# Patient Record
Sex: FEMALE | Race: BLACK OR AFRICAN AMERICAN | Employment: UNEMPLOYED | URBAN - METROPOLITAN AREA
[De-identification: names, ages, dates, MRNs, and addresses within clinical notes are randomized per-mention and may not be internally consistent; named-entity substitution may affect disease eponyms.]

---

## 2019-01-09 ENCOUNTER — HISTORICAL (OUTPATIENT)
Dept: ADMINISTRATIVE | Facility: HOSPITAL | Age: 59
End: 2019-01-09

## 2019-01-09 LAB
ERYTHROCYTE [DISTWIDTH] IN BLOOD BY AUTOMATED COUNT: 12.7 % (ref 11.5–17)
HCT VFR BLD AUTO: 42.7 % (ref 37–47)
HGB BLD-MCNC: 13.7 GM/DL (ref 12–16)
MCH RBC QN AUTO: 27.6 PG (ref 27–31)
MCHC RBC AUTO-ENTMCNC: 32.1 GM/DL (ref 33–36)
MCV RBC AUTO: 86.1 FL (ref 80–94)
PLATELET # BLD AUTO: 257 X10(3)/MCL (ref 130–400)
PMV BLD AUTO: 10.8 FL (ref 9.4–12.4)
RBC # BLD AUTO: 4.96 X10(6)/MCL (ref 4.2–5.4)
WBC # SPEC AUTO: 6.7 X10(3)/MCL (ref 4.5–11.5)

## 2019-01-10 ENCOUNTER — HISTORICAL (OUTPATIENT)
Dept: ADMINISTRATIVE | Facility: HOSPITAL | Age: 59
End: 2019-01-10

## 2019-09-09 ENCOUNTER — HOSPITAL ENCOUNTER (OUTPATIENT)
Dept: MEDSURG UNIT | Facility: HOSPITAL | Age: 59
End: 2019-09-11
Attending: INTERNAL MEDICINE | Admitting: INTERNAL MEDICINE

## 2019-09-09 LAB
ABS NEUT (OLG): 3.2 X10(3)/MCL (ref 2.1–9.2)
ALBUMIN SERPL-MCNC: 3.4 GM/DL (ref 3.4–5)
ALBUMIN/GLOB SERPL: 0.9 RATIO (ref 1.1–2)
ALP SERPL-CCNC: 96 UNIT/L (ref 45–117)
ALT SERPL-CCNC: 39 UNIT/L (ref 12–78)
AST SERPL-CCNC: 26 UNIT/L (ref 15–37)
BASOPHILS NFR BLD MANUAL: 0 %
BILIRUB SERPL-MCNC: 0.2 MG/DL (ref 0.2–1)
BILIRUBIN DIRECT+TOT PNL SERPL-MCNC: <0.1 MG/DL
BILIRUBIN DIRECT+TOT PNL SERPL-MCNC: ABNORMAL MG/DL
BUN SERPL-MCNC: 15 MG/DL (ref 7–18)
CALCIUM SERPL-MCNC: 8.6 MG/DL (ref 8.5–10.1)
CHLORIDE SERPL-SCNC: 107 MMOL/L (ref 98–107)
CK MB SERPL-MCNC: 1.2 NG/ML (ref 1–3.6)
CK SERPL-CCNC: 118 UNIT/L (ref 26–192)
CO2 SERPL-SCNC: 28 MMOL/L (ref 21–32)
CREAT SERPL-MCNC: 0.7 MG/DL (ref 0.6–1.3)
EOSINOPHIL NFR BLD MANUAL: 0 %
ERYTHROCYTE [DISTWIDTH] IN BLOOD BY AUTOMATED COUNT: 12.9 % (ref 11.5–14.5)
GLOBULIN SER-MCNC: 3.8 GM/ML (ref 2.3–3.5)
GLUCOSE SERPL-MCNC: 172 MG/DL (ref 74–106)
GRANULOCYTES NFR BLD MANUAL: 38 % (ref 43–75)
HCT VFR BLD AUTO: 38.5 % (ref 35–46)
HGB BLD-MCNC: 12.7 GM/DL (ref 12–16)
LIPASE SERPL-CCNC: 764 UNIT/L (ref 73–393)
LYMPHOCYTES NFR BLD MANUAL: 57 % (ref 20.5–51.1)
MAGNESIUM SERPL-MCNC: 1.8 MG/DL (ref 1.6–2.6)
MCH RBC QN AUTO: 28.5 PG (ref 26–34)
MCHC RBC AUTO-ENTMCNC: 33 GM/DL (ref 31–37)
MCV RBC AUTO: 86.3 FL (ref 80–100)
MONOCYTES NFR BLD MANUAL: 5 % (ref 2–9)
PLATELET # BLD AUTO: 253 X10(3)/MCL (ref 130–400)
PLATELET # BLD EST: NORMAL 10*3/UL
PMV BLD AUTO: 10.6 FL (ref 7.4–10.4)
POTASSIUM SERPL-SCNC: 3.2 MMOL/L (ref 3.5–5.1)
PROT SERPL-MCNC: 7.2 GM/DL (ref 6.4–8.2)
RBC # BLD AUTO: 4.46 X10(6)/MCL (ref 4–5.2)
RBC MORPH BLD: NORMAL
SODIUM SERPL-SCNC: 141 MMOL/L (ref 136–145)
TROPONIN I SERPL-MCNC: <0.015 NG/ML (ref 0–0.05)
WBC # SPEC AUTO: 8.1 X10(3)/MCL (ref 4.5–11)

## 2019-09-10 LAB
ABS NEUT (OLG): 3.67 X10(3)/MCL (ref 2.1–9.2)
ALBUMIN SERPL-MCNC: 3.4 GM/DL (ref 3.4–5)
ALBUMIN/GLOB SERPL: 0.9 RATIO (ref 1.1–2)
ALP SERPL-CCNC: 79 UNIT/L (ref 45–117)
ALT SERPL-CCNC: 35 UNIT/L (ref 12–78)
AST SERPL-CCNC: 18 UNIT/L (ref 15–37)
BASOPHILS # BLD AUTO: 0 X10(3)/MCL (ref 0–0.2)
BASOPHILS NFR BLD AUTO: 0 %
BILIRUB SERPL-MCNC: 0.3 MG/DL (ref 0.2–1)
BILIRUBIN DIRECT+TOT PNL SERPL-MCNC: <0.1 MG/DL
BILIRUBIN DIRECT+TOT PNL SERPL-MCNC: ABNORMAL MG/DL
BUN SERPL-MCNC: 10 MG/DL (ref 7–18)
CALCIUM SERPL-MCNC: 8.8 MG/DL (ref 8.5–10.1)
CHLORIDE SERPL-SCNC: 107 MMOL/L (ref 98–107)
CO2 SERPL-SCNC: 31 MMOL/L (ref 21–32)
CREAT SERPL-MCNC: 0.6 MG/DL (ref 0.6–1.3)
EOSINOPHIL # BLD AUTO: 0.1 X10(3)/MCL (ref 0–0.9)
EOSINOPHIL NFR BLD AUTO: 2 %
ERYTHROCYTE [DISTWIDTH] IN BLOOD BY AUTOMATED COUNT: 12.8 % (ref 11.5–14.5)
EST. AVERAGE GLUCOSE BLD GHB EST-MCNC: 137 MG/DL
GLOBULIN SER-MCNC: 3.8 GM/ML (ref 2.3–3.5)
GLUCOSE SERPL-MCNC: 121 MG/DL (ref 74–106)
HBA1C MFR BLD: 6.4 % (ref 4.2–6.3)
HCT VFR BLD AUTO: 38 % (ref 35–46)
HGB BLD-MCNC: 12.4 GM/DL (ref 12–16)
IMM GRANULOCYTES # BLD AUTO: 0.01 10*3/UL
IMM GRANULOCYTES NFR BLD AUTO: 0 %
LYMPHOCYTES # BLD AUTO: 3.6 X10(3)/MCL (ref 0.6–4.6)
LYMPHOCYTES NFR BLD AUTO: 45 %
MCH RBC QN AUTO: 27.7 PG (ref 26–34)
MCHC RBC AUTO-ENTMCNC: 32.6 GM/DL (ref 31–37)
MCV RBC AUTO: 85 FL (ref 80–100)
MONOCYTES # BLD AUTO: 0.5 X10(3)/MCL (ref 0.1–1.3)
MONOCYTES NFR BLD AUTO: 6 %
NEUTROPHILS # BLD AUTO: 3.67 X10(3)/MCL (ref 2.1–9.2)
NEUTROPHILS NFR BLD AUTO: 46 %
PLATELET # BLD AUTO: 240 X10(3)/MCL (ref 130–400)
PMV BLD AUTO: 10.8 FL (ref 7.4–10.4)
POTASSIUM SERPL-SCNC: 4 MMOL/L (ref 3.5–5.1)
PROT SERPL-MCNC: 7.2 GM/DL (ref 6.4–8.2)
RBC # BLD AUTO: 4.47 X10(6)/MCL (ref 4–5.2)
SODIUM SERPL-SCNC: 141 MMOL/L (ref 136–145)
WBC # SPEC AUTO: 7.9 X10(3)/MCL (ref 4.5–11)

## 2019-09-11 LAB
ABS NEUT (OLG): 1.72 X10(3)/MCL (ref 2.1–9.2)
ALBUMIN SERPL-MCNC: 2.8 GM/DL (ref 3.4–5)
ALBUMIN/GLOB SERPL: 0.8 RATIO (ref 1.1–2)
ALP SERPL-CCNC: 69 UNIT/L (ref 45–117)
ALT SERPL-CCNC: 27 UNIT/L (ref 12–78)
ANISOCYTOSIS BLD QL SMEAR: NORMAL
APPEARANCE, UA: CLEAR
AST SERPL-CCNC: 15 UNIT/L (ref 15–37)
BACTERIA #/AREA URNS AUTO: ABNORMAL /[HPF]
BASOPHILS NFR BLD MANUAL: 1 %
BILIRUB SERPL-MCNC: 0.3 MG/DL (ref 0.2–1)
BILIRUB UR QL STRIP: NEGATIVE
BILIRUBIN DIRECT+TOT PNL SERPL-MCNC: <0.1 MG/DL (ref 0–0.2)
BILIRUBIN DIRECT+TOT PNL SERPL-MCNC: >0.2 MG/DL
BUN SERPL-MCNC: 7 MG/DL (ref 7–18)
CALCIUM SERPL-MCNC: 7.9 MG/DL (ref 8.5–10.1)
CHLORIDE SERPL-SCNC: 108 MMOL/L (ref 98–107)
CHOLEST SERPL-MCNC: 199 MG/DL
CHOLEST/HDLC SERPL: 4.6 {RATIO} (ref 0–4.4)
CO2 SERPL-SCNC: 29 MMOL/L (ref 21–32)
COLOR UR: NORMAL
CREAT SERPL-MCNC: 0.6 MG/DL (ref 0.6–1.3)
CREAT UR-MCNC: 34 MG/DL
EOSINOPHIL NFR BLD MANUAL: 7 %
ERYTHROCYTE [DISTWIDTH] IN BLOOD BY AUTOMATED COUNT: 12.8 % (ref 11.5–14.5)
GLOBULIN SER-MCNC: 3.6 GM/ML (ref 2.3–3.5)
GLUCOSE (UA): NORMAL
GLUCOSE SERPL-MCNC: 123 MG/DL (ref 74–106)
GRANULOCYTES NFR BLD MANUAL: 47 % (ref 43–75)
HCT VFR BLD AUTO: 35.6 % (ref 35–46)
HDLC SERPL-MCNC: 43 MG/DL (ref 40–59)
HGB BLD-MCNC: 11.4 GM/DL (ref 12–16)
HGB UR QL STRIP: NEGATIVE
HYALINE CASTS #/AREA URNS LPF: ABNORMAL /[LPF]
HYPOCHROMIA BLD QL SMEAR: NORMAL
KETONES UR QL STRIP: NEGATIVE
LDLC SERPL CALC-MCNC: 115 MG/DL
LEUKOCYTE ESTERASE UR QL STRIP: NEGATIVE
LIPASE SERPL-CCNC: 189 UNIT/L (ref 73–393)
LYMPHOCYTES NFR BLD MANUAL: 42 % (ref 20.5–51.1)
MCH RBC QN AUTO: 27.4 PG (ref 26–34)
MCHC RBC AUTO-ENTMCNC: 32 GM/DL (ref 31–37)
MCV RBC AUTO: 85.6 FL (ref 80–100)
MICROALBUMIN UR-MCNC: 5.8 MG/L (ref 0–19)
MICROALBUMIN/CREAT RATIO PNL UR: 17.1 MCG/MG CR (ref 0–29)
MICROCYTES BLD QL SMEAR: NORMAL
MONOCYTES NFR BLD MANUAL: 3 % (ref 2–9)
NITRITE UR QL STRIP: NEGATIVE
PH UR STRIP: 7.5 [PH] (ref 4.5–8)
PLATELET # BLD AUTO: 208 X10(3)/MCL (ref 130–400)
PLATELET # BLD EST: ADEQUATE 10*3/UL
PMV BLD AUTO: 10.5 FL (ref 7.4–10.4)
POLYCHROMASIA BLD QL SMEAR: NORMAL
POTASSIUM SERPL-SCNC: 3.9 MMOL/L (ref 3.5–5.1)
PROT SERPL-MCNC: 6.4 GM/DL (ref 6.4–8.2)
PROT UR QL STRIP: NEGATIVE
RBC # BLD AUTO: 4.16 X10(6)/MCL (ref 4–5.2)
RBC #/AREA URNS AUTO: ABNORMAL /[HPF]
RBC MORPH BLD: NORMAL
SODIUM SERPL-SCNC: 143 MMOL/L (ref 136–145)
SP GR UR STRIP: 1.01 (ref 1–1.03)
SQUAMOUS #/AREA URNS LPF: ABNORMAL /[LPF]
TRIGL SERPL-MCNC: 205 MG/DL
UROBILINOGEN UR STRIP-ACNC: NORMAL
VLDLC SERPL CALC-MCNC: 41 MG/DL
WBC # SPEC AUTO: 5.8 X10(3)/MCL (ref 4.5–11)
WBC #/AREA URNS AUTO: ABNORMAL /HPF

## 2019-11-26 ENCOUNTER — HISTORICAL (OUTPATIENT)
Dept: ADMINISTRATIVE | Facility: HOSPITAL | Age: 59
End: 2019-11-26

## 2019-12-18 ENCOUNTER — HOSPITAL ENCOUNTER (OUTPATIENT)
Dept: MEDSURG UNIT | Facility: HOSPITAL | Age: 59
End: 2019-12-20
Attending: INTERNAL MEDICINE | Admitting: INTERNAL MEDICINE

## 2019-12-18 LAB
ALBUMIN SERPL-MCNC: 3.6 GM/DL (ref 3.4–5)
ALBUMIN/GLOB SERPL: 0.8 RATIO (ref 1.1–2)
ALP SERPL-CCNC: 91 UNIT/L (ref 45–117)
ALT SERPL-CCNC: 30 UNIT/L (ref 12–78)
ANISOCYTOSIS BLD QL SMEAR: ABNORMAL
AST SERPL-CCNC: 13 UNIT/L (ref 15–37)
BASOPHILS NFR BLD MANUAL: 1 %
BILIRUB SERPL-MCNC: 0.3 MG/DL (ref 0.2–1)
BILIRUBIN DIRECT+TOT PNL SERPL-MCNC: 0.1 MG/DL (ref 0–0.2)
BILIRUBIN DIRECT+TOT PNL SERPL-MCNC: 0.2 MG/DL
BUN SERPL-MCNC: 15 MG/DL (ref 7–18)
CALCIUM SERPL-MCNC: 9.2 MG/DL (ref 8.5–10.1)
CHLORIDE SERPL-SCNC: 102 MMOL/L (ref 98–107)
CK MB SERPL-MCNC: <1 NG/ML (ref 1–3.6)
CK SERPL-CCNC: 70 UNIT/L (ref 26–192)
CO2 SERPL-SCNC: 30 MMOL/L (ref 21–32)
CREAT SERPL-MCNC: 0.7 MG/DL (ref 0.6–1.3)
EOSINOPHIL NFR BLD MANUAL: 1 %
ERYTHROCYTE [DISTWIDTH] IN BLOOD BY AUTOMATED COUNT: 12.9 % (ref 11.5–14.5)
GLOBULIN SER-MCNC: 4.4 GM/ML (ref 2.3–3.5)
GLUCOSE SERPL-MCNC: 150 MG/DL (ref 74–106)
GRANULOCYTES NFR BLD MANUAL: 40 % (ref 43–75)
HCT VFR BLD AUTO: 42.3 % (ref 35–46)
HGB BLD-MCNC: 13.6 GM/DL (ref 12–16)
LDH SERPL-CCNC: 159 UNIT/L (ref 84–246)
LIPASE SERPL-CCNC: 534 UNIT/L (ref 73–393)
LYMPHOCYTES NFR BLD MANUAL: 57 % (ref 20.5–51.1)
MCH RBC QN AUTO: 27.8 PG (ref 26–34)
MCHC RBC AUTO-ENTMCNC: 32.2 GM/DL (ref 31–37)
MCV RBC AUTO: 86.3 FL (ref 80–100)
MONOCYTES NFR BLD MANUAL: 1 % (ref 2–9)
PLATELET # BLD AUTO: 274 X10(3)/MCL (ref 130–400)
PLATELET # BLD EST: NORMAL 10*3/UL
PMV BLD AUTO: 10.7 FL (ref 7.4–10.4)
POC TROPONIN: 0 NG/ML (ref 0–0.08)
POLYCHROMASIA BLD QL SMEAR: ABNORMAL
POTASSIUM SERPL-SCNC: 3.8 MMOL/L (ref 3.5–5.1)
PROT SERPL-MCNC: 8 GM/DL (ref 6.4–8.2)
RBC # BLD AUTO: 4.9 X10(6)/MCL (ref 4–5.2)
RBC MORPH BLD: ABNORMAL
SODIUM SERPL-SCNC: 138 MMOL/L (ref 136–145)
WBC # SPEC AUTO: 8.3 X10(3)/MCL (ref 4.5–11)

## 2019-12-19 LAB
ABS NEUT (OLG): 2.55 X10(3)/MCL (ref 2.1–9.2)
ALBUMIN SERPL-MCNC: 3.1 GM/DL (ref 3.4–5)
ALBUMIN/GLOB SERPL: 0.8 RATIO (ref 1.1–2)
ALP SERPL-CCNC: 76 UNIT/L (ref 45–117)
ALT SERPL-CCNC: 25 UNIT/L (ref 12–78)
AST SERPL-CCNC: 11 UNIT/L (ref 15–37)
BASOPHILS # BLD AUTO: 0 X10(3)/MCL (ref 0–0.2)
BASOPHILS NFR BLD AUTO: 0 %
BILIRUB SERPL-MCNC: 0.3 MG/DL (ref 0.2–1)
BILIRUBIN DIRECT+TOT PNL SERPL-MCNC: 0.1 MG/DL (ref 0–0.2)
BILIRUBIN DIRECT+TOT PNL SERPL-MCNC: 0.2 MG/DL
BUN SERPL-MCNC: 13 MG/DL (ref 7–18)
CALCIUM SERPL-MCNC: 9 MG/DL (ref 8.5–10.1)
CHLORIDE SERPL-SCNC: 105 MMOL/L (ref 98–107)
CHOLEST SERPL-MCNC: 209 MG/DL
CHOLEST/HDLC SERPL: 4.3 {RATIO} (ref 0–4.4)
CO2 SERPL-SCNC: 29 MMOL/L (ref 21–32)
CREAT SERPL-MCNC: 0.6 MG/DL (ref 0.6–1.3)
EOSINOPHIL # BLD AUTO: 0.1 X10(3)/MCL (ref 0–0.9)
EOSINOPHIL NFR BLD AUTO: 2 %
ERYTHROCYTE [DISTWIDTH] IN BLOOD BY AUTOMATED COUNT: 12.8 % (ref 11.5–14.5)
GLOBULIN SER-MCNC: 3.8 GM/ML (ref 2.3–3.5)
GLUCOSE SERPL-MCNC: 134 MG/DL (ref 74–106)
HCT VFR BLD AUTO: 38.7 % (ref 35–46)
HDLC SERPL-MCNC: 49 MG/DL (ref 40–59)
HGB BLD-MCNC: 12.6 GM/DL (ref 12–16)
IMM GRANULOCYTES # BLD AUTO: 0.02 10*3/UL
IMM GRANULOCYTES NFR BLD AUTO: 0 %
LDLC SERPL CALC-MCNC: 132 MG/DL
LYMPHOCYTES # BLD AUTO: 3.5 X10(3)/MCL (ref 0.6–4.6)
LYMPHOCYTES NFR BLD AUTO: 52 %
MCH RBC QN AUTO: 28.1 PG (ref 26–34)
MCHC RBC AUTO-ENTMCNC: 32.6 GM/DL (ref 31–37)
MCV RBC AUTO: 86.4 FL (ref 80–100)
MONOCYTES # BLD AUTO: 0.5 X10(3)/MCL (ref 0.1–1.3)
MONOCYTES NFR BLD AUTO: 7 %
NEUTROPHILS # BLD AUTO: 2.55 X10(3)/MCL (ref 2.1–9.2)
NEUTROPHILS NFR BLD AUTO: 38 %
PLATELET # BLD AUTO: 258 X10(3)/MCL (ref 130–400)
PMV BLD AUTO: 10.5 FL (ref 7.4–10.4)
POTASSIUM SERPL-SCNC: 4 MMOL/L (ref 3.5–5.1)
PROT SERPL-MCNC: 6.9 GM/DL (ref 6.4–8.2)
RBC # BLD AUTO: 4.48 X10(6)/MCL (ref 4–5.2)
SODIUM SERPL-SCNC: 140 MMOL/L (ref 136–145)
TRIGL SERPL-MCNC: 138 MG/DL
TROPONIN I SERPL-MCNC: <0.015 NG/ML (ref 0–0.05)
VLDLC SERPL CALC-MCNC: 28 MG/DL
WBC # SPEC AUTO: 6.7 X10(3)/MCL (ref 4.5–11)

## 2019-12-20 LAB
ABS NEUT (OLG): 1.88 X10(3)/MCL (ref 2.1–9.2)
ALBUMIN SERPL-MCNC: 3.1 GM/DL (ref 3.4–5)
ALBUMIN/GLOB SERPL: 0.9 RATIO (ref 1.1–2)
ALP SERPL-CCNC: 64 UNIT/L (ref 45–117)
ALT SERPL-CCNC: 21 UNIT/L (ref 12–78)
AST SERPL-CCNC: 11 UNIT/L (ref 15–37)
BASOPHILS # BLD AUTO: 0 X10(3)/MCL (ref 0–0.2)
BASOPHILS NFR BLD AUTO: 0 %
BILIRUB SERPL-MCNC: 0.4 MG/DL (ref 0.2–1)
BILIRUBIN DIRECT+TOT PNL SERPL-MCNC: <0.1 MG/DL (ref 0–0.2)
BILIRUBIN DIRECT+TOT PNL SERPL-MCNC: ABNORMAL MG/DL
BUN SERPL-MCNC: 7 MG/DL (ref 7–18)
CALCIUM SERPL-MCNC: 9 MG/DL (ref 8.5–10.1)
CHLORIDE SERPL-SCNC: 106 MMOL/L (ref 98–107)
CO2 SERPL-SCNC: 30 MMOL/L (ref 21–32)
CREAT SERPL-MCNC: 0.6 MG/DL (ref 0.6–1.3)
EOSINOPHIL # BLD AUTO: 0.1 X10(3)/MCL (ref 0–0.9)
EOSINOPHIL NFR BLD AUTO: 2 %
ERYTHROCYTE [DISTWIDTH] IN BLOOD BY AUTOMATED COUNT: 12.9 % (ref 11.5–14.5)
GLOBULIN SER-MCNC: 3.6 GM/ML (ref 2.3–3.5)
GLUCOSE SERPL-MCNC: 139 MG/DL (ref 74–106)
HCT VFR BLD AUTO: 37.2 % (ref 35–46)
HGB BLD-MCNC: 11.9 GM/DL (ref 12–16)
LYMPHOCYTES # BLD AUTO: 3.2 X10(3)/MCL (ref 0.6–4.6)
LYMPHOCYTES NFR BLD AUTO: 56 %
MCH RBC QN AUTO: 27.8 PG (ref 26–34)
MCHC RBC AUTO-ENTMCNC: 32 GM/DL (ref 31–37)
MCV RBC AUTO: 86.9 FL (ref 80–100)
MONOCYTES # BLD AUTO: 0.4 X10(3)/MCL (ref 0.1–1.3)
MONOCYTES NFR BLD AUTO: 8 %
NEUTROPHILS # BLD AUTO: 1.88 X10(3)/MCL (ref 2.1–9.2)
NEUTROPHILS NFR BLD AUTO: 33 %
PLATELET # BLD AUTO: 234 X10(3)/MCL (ref 130–400)
PMV BLD AUTO: 10.5 FL (ref 7.4–10.4)
POTASSIUM SERPL-SCNC: 4 MMOL/L (ref 3.5–5.1)
PROT SERPL-MCNC: 6.7 GM/DL (ref 6.4–8.2)
RBC # BLD AUTO: 4.28 X10(6)/MCL (ref 4–5.2)
SODIUM SERPL-SCNC: 141 MMOL/L (ref 136–145)
WBC # SPEC AUTO: 5.6 X10(3)/MCL (ref 4.5–11)

## 2020-01-06 ENCOUNTER — HISTORICAL (OUTPATIENT)
Dept: CARDIOLOGY | Facility: HOSPITAL | Age: 60
End: 2020-01-06

## 2021-12-28 ENCOUNTER — HISTORICAL (OUTPATIENT)
Dept: ADMINISTRATIVE | Facility: HOSPITAL | Age: 61
End: 2021-12-28

## 2021-12-28 LAB — SARS-COV-2 RNA RESP QL NAA+PROBE: NOT DETECTED

## 2022-04-11 ENCOUNTER — HISTORICAL (OUTPATIENT)
Dept: ADMINISTRATIVE | Facility: HOSPITAL | Age: 62
End: 2022-04-11
Payer: MEDICAID

## 2022-04-27 VITALS
SYSTOLIC BLOOD PRESSURE: 126 MMHG | WEIGHT: 194.44 LBS | HEIGHT: 63 IN | DIASTOLIC BLOOD PRESSURE: 82 MMHG | OXYGEN SATURATION: 99 % | BODY MASS INDEX: 34.45 KG/M2

## 2022-04-30 NOTE — H&P
Patient:   Abbi Amador            MRN: [id number]            FIN: [id number]               Age:   58 years     Sex:  Female     :  1960   Associated Diagnoses:   None   Author:   Gini Thurston MD      Chief Complaint   2019 22:19 CST     ABDOMINAL PAIN WITH  NAUSEA        History of Present Illness   58 year old female with PMHx of HTN, DM presented to the ED on 19 for 4 day history of worsened abdominal pain. She originally presented to the ED on 12/15/19 for this same pain and treated for GERD with Protonix and Zantac at that time. The pain never resolved and in fact increased which prompted her to seek evaluation. She located the pain in her left upper quadrant and stated that it radiated to midline and straight to her back.  She describes it as an intermittent gnawing pain associated with nausea and decreased appetite.  Anything by mouth increases her nausea and pain.  She denied vomiting, diarrhea, fever, chills.  The pain has caused her to miss the past 3 days of work.  Of note she stated that this pain originally began about one year ago and she believes it is associated with taking metformin.    ED Course: On presentation she was normotensive at 134/75, HR 95, afebrile, breathing at a rate of 20 breaths per minute, and satting 98 % on RA.  She was given a GI cocktail which reduced her pain some.  Labs were significant for WBC that was within normal limits.  And a lipase of 534 which was increased from 166 from her 12/15/1980 visit.  Given the description of pain typical of pancreatitis and elevated lipase level, medicine was consulted for admission for acute pancreatitis.    Meds: Amlodipine/benazepril 5/10mg, rosuvastatin 40 mg, prescribed metformin but is not taking due to abdominal pain  FamHx: Mother breast cancer,   PSHx: Hysterectomy, right carpal tunnel surgery  Social Hx: 5 cigarettes per day ×25 years, No drinking, No illicit drug use  Allergies:  NSAIDs, sulfa drugs      Review of Systems   Constitutional:  No fever, No chills.    Ear/Nose/Mouth/Throat:  No nasal congestion, No sore throat.    Respiratory:  No shortness of breath.    Cardiovascular:  No chest pain, No peripheral edema.    Gastrointestinal:  Nausea, No vomiting, No diarrhea, No constipation.         Abdominal pain: Left, Upper quadrant, Epigastric area.    Genitourinary:  No dysuria.    Musculoskeletal:  No back pain, No joint pain, No muscle pain.    Integumentary:  No other significant skin complaints, No rash.    Neurologic:  Alert and oriented X4, No numbness, No tingling, No headache.       Physical Examination      Vital Signs (last 24 hrs)_____  Last Charted___________  Temp Oral     37.0 DegC  (DEC 19 00:55)  Heart Rate Peripheral   L 55bpm  (DEC 19 00:55)  Resp Rate         18 br/min  (DEC 19 00:55)  SBP      H 145mmHg  (DEC 19 00:55)  DBP      83 mmHg  (DEC 19 00:55)  SpO2      100 %  (DEC 19 00:55)  Weight      84 kg  (DEC 19 00:58)  Height      159 cm  (DEC 19 00:58)  BMI      33.23  (DEC 19 00:58)     General:  No acute distress, Lying in bed, appears mildly uncomfortable.    Eye:  Extraocular movements are intact, Normal conjunctiva.    HENT:  Normocephalic.    Neck:  Supple, Non-tender, No lymphadenopathy, No thyromegaly.    Respiratory:  Lungs are clear to auscultation, Respirations are non-labored, Breath sounds are equal, Symmetrical chest wall expansion.    Cardiovascular:  Normal rate, Regular rhythm, No murmur, No gallop, No edema.    Gastrointestinal:  Soft, Non-distended, Normal bowel sounds, No organomegaly.         Abdomen: Tenderness, To deep palpation in left upper quadrant.    Musculoskeletal:  No swelling, No deformity.    Integumentary:  Warm, Dry, Intact, No pallor, No rash.    Neurologic:  Alert, Oriented, No focal deficits.    Cognition and Speech:  Speech clear and coherent.    Psychiatric:  Cooperative, Appropriate mood & affect.       Review / Management    Results review:  All Results   12/18/2019 22:49 CST     POC Troponin              0.00 ng/mL    12/18/2019 22:48 CST     WBC                       8.3 x10(3)/mcL                             RBC                       4.90 x10(6)/mcL                             Hgb                       13.6 gm/dL                             Hct                       42.3 %                             Platelet                  274 x10(3)/mcL                             MCV                       86.3 fL                             MCH                       27.8 pg                             MCHC                      32.2 gm/dL                             RDW                       12.9 %                             MPV                       10.7 fL  HI                             Segs Man                  40 %  LOW                             Lymph Man                 57.0 %  HI                             Monocyte Man              1 %  LOW                             Eos Man                   1 %                             Basophil Man              1 %                             Platelet Est              Normal                             Anisocyte                 1+                             Polychrom                 1+                             RBC Morph                 Abnormal                             Sodium Lvl                138 mmol/L                             Potassium Lvl             3.8 mmol/L                             Chloride                  102 mmol/L                             CO2                       30 mmol/L                             Calcium Lvl               9.2 mg/dL                             Glucose Lvl               150 mg/dL  HI                             BUN                       15 mg/dL                             Creatinine                0.70 mg/dL                             eGFR-AA                   >105 mL/min                             eGFR-ABHAY                  92 mL/min                              Bili Total                0.3 mg/dL                             Bili Direct               0.1 mg/dL                             Bili Indirect             0.2 mg/dL                             AST                       13 unit/L  LOW                             ALT                       30 unit/L                             Alk Phos                  91 unit/L                             Total Protein             8.0 gm/dL                             Albumin Lvl               3.6 gm/dL                             Globulin                  4.40 gm/mL  HI                             A/G Ratio                 0.8 ratio  LOW                             LDH                       159 unit/L                             Lipase Lvl                534 unit/L  HI                             Total CK                  70 unit/L                             CK MB                     <1.0 ng/mL  .       Impression and Plan   58 year old female with PMHx of HTN, DM admitted for acute pancreatitis    1.  Acute pancreatitis  -NPO     -Consider advancing as tolerated in AM  -LR 1L bolus then 300mL/hr  -Morphine 1mg q3h PRN  -Zofran 4mg q4h PRN Nausea    2. DM  -Holding home metformin     -Due to possibility of drug induced pancreatitis  -Monitor CBG    3. HTN  -Holding home benazepril     -Due to possibility of drug induced pancreatitis  -Continue home amlodipine    Access: PIV  Antibiotics: None  Diet: NPO  DVT Prophylaxis: SCDs  GI Prophylaxis: None  Fluids: LR 300mL/hr     Disposition: Admit to medical unit. Continue IVF and pain control. Advanced diet as tolerated once pain and nausea controlled.

## 2022-04-30 NOTE — ED PROVIDER NOTES
"   Patient:   Abbi Amador            MRN: [id number]            FIN: [id number]               Age:   58 years     Sex:  Female     :  1960   Associated Diagnoses:   Gastritis; Pancreatitis   Author:   Sarthak Saini MD      Basic Information   Time seen: Date & time 2019 20:21:00.   History source: Patient.   Arrival mode: Private vehicle.   History limitation: None.   Additional information: Chief Complaint from Nursing Triage Note : Chief Complaint   2019 19:57 CDT       Chief Complaint           mid upper abd pain and nausea since this am; states "ate greasy chicken last night". took x3 antacid tums and Tylenol x2 500mg approx 1hr pta.  (Modified) .   Provider/Visit info:   Time Seen:  Sarthak Saini MD / 2019 20:07  .   History of Present Illness   The patient presents with epigastric pain that is crampy which started this AM.  nausea, no vomiting..  The onset was 12  hours ago.  The course/duration of symptoms is constant and worsening.  Location: epigastric. Radiating pain: none. The character of symptoms is crampy  .  The degree at onset was moderate.  The degree at maximum was 7 /10.  .  The degree at present is 7 /10.  The exacerbating factor is none.  The relieving factor is none.  Risk factors consist of hypertension, diabetes mellitus and obesity.  Prior episodes: none.  Therapy today None.  Associated symptoms: nausea, denies shortness of breath, denies vomiting, denies diaphoresis, denies anxiety and denies palpitations.        Review of Systems   Constitutional symptoms:  No fever, no chills, no sweats.    Skin symptoms:  No rash,    Eye symptoms:  No recent vision problems,    ENMT symptoms:  No sore throat,    Respiratory symptoms:  No shortness of breath, no cough.    Cardiovascular symptoms:  No chest pain, no tachycardia.    Gastrointestinal symptoms:  Negative except as documented in HPI.   Genitourinary symptoms:  No dysuria,    Musculoskeletal symptoms:  No " back pain, no Muscle pain.    Neurologic symptoms:  No headache, no dizziness.              Additional review of systems information: All other systems reviewed and otherwise negative.      Health Status   Allergies:    Allergic Reactions (Selected)  Severity Not Documented  NSAIDs- No reactions were documented.  Sulfamethoxazole- No reactions were documented.,    Allergies (2) Active Reaction  NSAIDs None Documented  sulfamethoxazole None Documented  .   Medications:  (Selected)   Inpatient Medications  Ordered  GI Cocktail - [facility]: 40 mL, form: Susp, Oral, Once, first dose 09/09/19 20:09:00 CDT, stop date 09/09/19 20:09:00 CDT, STAT  Prescriptions  Prescribed  Norvasc 5 mg oral tablet: 5 mg = 1 tab(s), Oral, Daily, Patient needs to make appointment, # 30 tab(s), 0 Refill(s), Pharmacy: [id number]  Documented Medications  Documented  ATORVASTATIN 40 MG TABLET:   JANUMET XR  MG TABLET: 1 tab(s), Oral, Daily.      Past Medical/ Family/ Social History   Medical history:    No active or resolved past medical history items have been selected or recorded..   Surgical history:    76437 - EXC GANGLION, WRIST (Right) on 1/10/2019 at 58 Years.  Comments:  1/10/2019 8:00 Jennifer Sanchez RN  auto-populated from documented surgical case  [id number] - NEUROPLASTY / TRANSPOSITION MED NRV CARPAL TUNNEL (Right) on 1/10/2019 at 58 Years.  Comments:  1/10/2019 8:00 Jennifer Sanchez RN  auto-populated from documented surgical case  Biopsy Gastrointestional on 1/16/2015 at 54 Years.  Comments:  1/16/2015 9:45 Waqas Stringer RN  auto-populated from documented surgical case  Colonoscopy on 1/16/2015 at 54 Years.  Comments:  1/16/2015 9:45 Waqas Stringer RN  auto-populated from documented surgical case  Abdominal hysterectomy (162260419) in 1997 at 36 Years.  Hysterectomy ([id number]).  Hysterectomy ([id number]).  breast cyst removed.  Comments:  1/10/2019 6:41 ANITA Brewer  Aries CUELLAR, Antoinette MORROW  2016.   Family history:    No family history items have been selected or recorded..   Social history:    Social & Psychosocial Habits    Alcohol  01/16/2015 Risk Assessment: Denies Alcohol Use      Comment: denies - 11/26/2018 00:28 Shamar Rubi RN.    Substance Use  01/16/2015 Risk Assessment: Denies Substance Abuse      Comment: denies - 11/26/2018 00:28 - Shamar Ryan RN.    Tobacco  09/09/2019  Use: 4 or less cigarettes(less    Type: Cigarettes    Patient Wants Consult For Cessation Counseling No    Abuse/Neglect  09/09/2019  SHX Any signs of abuse or neglect No  .   Problem list:    Active Problems (5)  Chronic back pain   Diabetes mellitus   Diverticulosis   Hypertension   Tobacco user   .      Physical Examination               Vital Signs      Vital Signs (last 24 hrs)_____  Last Charted___________  Temp Oral     36.8 DegC  (SEP 09 19:57)  Heart Rate Peripheral   82 bpm  (SEP 09 19:57)  Resp Rate         17 br/min  (SEP 09 19:57)  SBP      139 mmHg  (SEP 09 19:57)  DBP      75 mmHg  (SEP 09 19:57)  SpO2      100 %  (SEP 09 19:57)  Weight      85.45 kg  (SEP 09 19:57)  .   General:  Alert, no acute distress.    Skin:  Warm, dry.    Head:  Normocephalic.   Neck:  Supple, trachea midline.    Eye:  Pupils are equal, round and reactive to light, extraocular movements are intact.    Ears, nose, mouth and throat:  Oral mucosa moist.   Cardiovascular:  Regular rate and rhythm, No murmur, Normal peripheral perfusion, No edema.    Respiratory:  Lungs are clear to auscultation, respirations are non-labored, breath sounds are equal, Symmetrical chest wall expansion.    Gastrointestinal:  Soft, Nontender, Non distended, Normal bowel sounds, No organomegaly.    Back:  Nontender, Normal range of motion.    Musculoskeletal:  Normal ROM, normal strength, no tenderness, no swelling, no deformity.    Neurological:  Alert and oriented to person, place, time, and situation, No focal  neurological deficit observed, CN II-XII intact, normal sensory observed, normal motor observed, normal speech observed, normal coordination observed.    Psychiatric:  Cooperative.      Medical Decision Making   Documents reviewed:  Emergency department nurses' notes.   Results review:  Lab results : Lab View   9/9/2019 20:17 CDT       Sodium Lvl                141 mmol/L                             Potassium Lvl             3.2 mmol/L  LOW                             Chloride                  107 mmol/L                             CO2                       28 mmol/L                             Calcium Lvl               8.6 mg/dL                             Glucose Lvl               172 mg/dL  HI                             BUN                       15 mg/dL                             Creatinine                0.70 mg/dL                             eGFR-AA                   >105 mL/min                             eGFR-ABHAY                  92 mL/min                             Bili Total                0.2 mg/dL                             Bili Direct               <0.1 mg/dL                             Bili Indirect             unable to calc mg/dL                             AST                       26 unit/L                             ALT                       39 unit/L                             Alk Phos                  96 unit/L                             Total Protein             7.2 gm/dL                             Albumin Lvl               3.4 gm/dL                             Globulin                  3.80 gm/mL  HI                             A/G Ratio                 0.9 ratio  LOW                             Lipase Lvl                764 unit/L  HI                             Total CK                  118 unit/L                             CK MB                     1.2 ng/mL                             Troponin-I                <0.015 ng/mL                             WBC                       8.1  x10(3)/mcL                             RBC                       4.46 x10(6)/mcL                             Hgb                       12.7 gm/dL                             Hct                       38.5 %                             Platelet                  253 x10(3)/mcL                             MCV                       86.3 fL                             MCH                       28.5 pg                             MCHC                      33.0 gm/dL                             RDW                       12.9 %                             MPV                       10.6 fL  HI                             Abs Neut                  3.20 x10(3)/mcL                             Segs Man                  38 %  LOW                             Lymph Man                 57.0 %  HI                             Monocyte Man              5 %                             Eos Man                   0 %                             Basophil Man              0 %                             Platelet Est              Normal                             RBC Morph                 Normal    .      Reexamination/ Reevaluation   Vital signs   results included from flowsheet : Vital Signs   9/9/2019 19:57 CDT       Temperature Oral          36.8 DegC                             Temperature Oral (calculated)             98.24 DegF                             Peripheral Pulse Rate     82 bpm                             Respiratory Rate          17 br/min                             SpO2                      100 %                             Oxygen Therapy            Room air                             Systolic Blood Pressure   139 mmHg                             Diastolic Blood Pressure  75 mmHg     Course: progressing as expected.   Pain status: resolved.      Impression and Plan   Diagnosis   Pancreatitis (UTP29-JV K85.90)    Diagnosis   Diagnosis code search       Calls-Consults   -  9/9/2019 22:19:00 , IM, consult.    Plan    Condition: Improved, Stable.    Disposition: Admit time  9/9/2019 22:20:00, Admit to Inpatient Unit,     Plan   Condition: Improved, Stable.    Disposition: Dispositioned by: Sarthak Saini MD, time: 9/9/2019 22:08:00, Discharged: To home, time  9/9/2019 22:08:00, Medically cleared.    Prescriptions: Launch prescriptions   Pharmacy:  Tylenol No 3 oral tablet (Document): Oral, Once, 0 Refill(s).    Patient was given the following educational materials: Gastritis, Adult.    Follow up with: Report to Emergency Department if symptoms return or worsen; Call office to Schedule Appointment; [place] Within 1 to 2 weeks, [place] Within 1 to 2 weeks; Call office to Schedule Appointment; Report to Emergency Department if symptoms return or worsen.    Counseled: Patient indicated understanding of instructions, Regarding prescription, Regarding treatment plan, Regarding diagnostic results, Regarding diagnosis, Patient.    Orders: [facility] Orders   Admit/Transfer/Discharge:  Discharge (Order): 9/9/2019 22:09 CDT, Home.

## 2022-04-30 NOTE — ED PROVIDER NOTES
Patient:   Abbi Amador            MRN: [id number]            FIN: [id number]               Age:   58 years     Sex:  Female     :  1960   Associated Diagnoses:   Pancreatitis   Author:   Jean Marie Grant MD      Basic Information   Time seen: Immediately upon arrival.   History source: Patient.   Arrival mode: Private vehicle.   History limitation: None.   Additional information: Chief Complaint from Nursing Triage Note : Chief Complaint   2019 22:19 CST     Chief Complaint           ABDOMINAL PAIN WITH  NAUSEA  .   Provider/Visit info:   Time Seen:  Jean Marie Grant MD / 2019 22:22  .   History of Present Illness   The patient presents with abdominal pain.  The onset was gradual.  The course/duration of symptoms is constant.  The character of symptoms is achy.  The degree at onset was moderate.  The Location of pain at onset was left, upper, lower and mid epigastric.  The degree at present is moderate.  The Location of pain at present is left, upper, abdominal and mid epigastric.  Radiating pain: none. The exacerbating factor is none.  The relieving factor is none.  Therapy today: none.  Risk factors consist of diabetes mellitus and hypertension.  Associated symptoms: nausea, denies vomiting, denies diarrhea and denies fever.  Additional history: sexual history: non-contributory.        Review of Systems   Constitutional symptoms:  No fever, no weakness, no fatigue.    Skin symptoms:  No rash, no petechiae.    Eye symptoms:  No recent vision problems,    Respiratory symptoms:  No shortness of breath, no orthopnea, no cough, no hemoptysis.    Cardiovascular symptoms:  No chest pain, no palpitations, no syncope, no diaphoresis, no peripheral edema.    Gastrointestinal symptoms:  Negative except as documented in HPI, no vomiting, no diarrhea, no constipation, no rectal bleeding.    Genitourinary symptoms:  No dysuria, no hematuria, no vaginal bleeding, no  vaginal discharge.    Musculoskeletal symptoms:  No back pain, no Joint pain.    Neurologic symptoms:  No headache, no dizziness, no altered level of consciousness, no numbness, no tingling, no weakness.    Hematologic/Lymphatic symptoms:  Negative except as documented in HPI.             Additional review of systems information: All other systems reviewed and otherwise negative.      Health Status   Allergies:    Allergic Reactions (Selected)  Severity Not Documented  NSAIDs- No reactions were documented.  Sulfamethoxazole- No reactions were documented.,    Allergies (2) Active Reaction  NSAIDs None Documented  sulfamethoxazole None Documented  .   Medications:  (Selected)   Prescriptions  Prescribed  Protonix 40 mg ORAL enteric coated tablet: 40 mg = 1 tab(s), Oral, Daily, # 30 tab(s), 0 Refill(s), Pharmacy: [facility]/pharmacy #[id number]  Zantac 150 oral tablet: 150 mg = 1 tab(s), Oral, BID, # 60 tab(s), 0 Refill(s)  rosuvastatin 40 mg oral tablet: 40 mg = 1 tab(s), Oral, Daily, # 30 tab(s), 0 Refill(s)  Documented Medications  Documented  Ala-Hist IR 2 mg oral tablet: 2 mg = 1 tab(s), Oral, BID  acetaminophen-codeine 300 mg-30 mg oral tablet.: 1-2 tab(s), Oral, QID  amlodipine-benazepril 5 mg-10 mg oral capsule: 1 cap(s), Oral, Daily  latanoprost 0.005% ophthalmic solution: 1 drop(s), Eye-Both, qPM  metformin 500 mg oral tablet: 500 mg = 1 tab(s), Oral, BID.   Menstrual history: Per nurse's notes.      Past Medical/ Family/ Social History   Medical history:    No active or resolved past medical history items have been selected or recorded..   Surgical history:    98749 - EXC GANGLION, WRIST (Right) on 1/10/2019 at 58 Years.  Comments:  1/10/2019 8:00 ANITA Dalal RN, Jennifer Jarquin  auto-populated from documented surgical case  [id number] - NEUROPLASTY / TRANSPOSITION MED NRV CARPAL TUNNEL (Right) on 1/10/2019 at 58 Years.  Comments:  1/10/2019 8:00 Jennifer Sanchez RN  auto-populated from documented surgical  case  Biopsy Gastrointestional on 1/16/2015 at 54 Years.  Comments:  1/16/2015 9:45 Waqas Stringer RN  auto-populated from documented surgical case  Colonoscopy on 1/16/2015 at 54 Years.  Comments:  1/16/2015 9:45 Waqas Stringer RN  auto-populated from documented surgical case  Abdominal hysterectomy ([id number]) in 1997 at 36 Years.  Hysterectomy ([id number]).  Hysterectomy ([id number]).  breast cyst removed.  Comments:  1/10/2019 6:41 ANITA Chris RN, Antoinette MORROW  2016.   Family history:    No family history items have been selected or recorded..   Social history:    Social & Psychosocial Habits    Alcohol  01/16/2015 Risk Assessment: Denies Alcohol Use      Comment: humberto - 11/26/2018 00:Shamar Quevedo RN    Employment/School  12/16/2019  Status: Employed    Exercise  12/16/2019  Duration (average number of minutes): 60    Times per week: 3-4 times/week    Exercise type: Walking    Home/Environment  12/16/2019  Lives with: Children, grand child son in law    Living situation: Home/Independent    Nutrition/Health  12/16/2019  Home Diet Regular    Appetite Good    Sexual  12/16/2019  Do you think of your sexual orientation as: Straight or heterosexual    What is your current gender identity? (Check all that apply) Identifies as female    Substance Use  01/16/2015 Risk Assessment: Denies Substance Abuse      Comment: humberto - 11/26/2018 00:Shamar Quevedo RN    Tobacco  12/18/2019  Use: 10 or more cigarettes (1/    Patient Wants Consult For Cessation Counseling No    Abuse/Neglect  12/18/2019  SHX Any signs of abuse or neglect No    Spiritual/Cultural  12/16/2019  Restorationism Preference Non denomination  , Alcohol use: Denies, Tobacco use: Regularly, Drug use: Denies.   Problem list:    Active Problems (5)  Chronic back pain   Diabetes mellitus   Diverticulosis   Hypertension   Tobacco user   .      Physical Examination               Vital Signs   Vital Signs   12/18/2019  22:19 CST     Temperature Oral          36.7 DegC                             Temperature Oral (calculated)             98.06 DegF                             Peripheral Pulse Rate     95 bpm                             Respiratory Rate          20 br/min                             SpO2                      98 %                             Oxygen Therapy            Room air                             Systolic Blood Pressure   134 mmHg                             Diastolic Blood Pressure  75 mmHg  .      Vital Signs (last 24 hrs)_____  Last Charted___________  Temp Oral     36.7 DegC  (DEC 18 22:19)  Heart Rate Peripheral   95 bpm  (DEC 18 22:19)  Resp Rate         20 br/min  (DEC 18 22:19)  SBP      134 mmHg  (DEC 18 22:19)  DBP      75 mmHg  (DEC 18 22:19)  SpO2      98 %  (DEC 18 22:19)  .   Measurements   12/18/2019 22:19 CST     Weight Dosing             85.5 kg                             Weight Measured and Calculated in Lbs     188.49 lb                             Weight Estimated          85.5 kg                             Height/Length Dosing      157.48 cm                             Height/Length Estimated   157.48 cm                             Body Mass Index Estimated 34.48 kg/m2  .   Basic Oxygen Information   12/18/2019 22:19 CST     SpO2                      98 %                             Oxygen Therapy            Room air  .   General:  Alert, no acute distress.    Skin:  Warm, dry, pink, intact, no pallor.    Head:  Normocephalic.   Neck:  Supple, no JVD.    Eye:  Pupils are equal, round and reactive to light, normal conjunctiva.    Ears, nose, mouth and throat:  Oral mucosa moist.   Cardiovascular:  Regular rate and rhythm, No murmur, Normal peripheral perfusion, No edema.    Respiratory:  Lungs are clear to auscultation, respirations are non-labored, breath sounds are equal, Symmetrical chest wall expansion.    Gastrointestinal:  Soft, Non distended, No organomegaly, Tenderness: Moderate,  epigastric, Guarding: Negative, Rebound: Negative, Bowel sounds: Normal.    Back:  Nontender, Normal range of motion.    Musculoskeletal:  Normal ROM, normal strength, no swelling.    Neurological:  Alert and oriented to person, place, time, and situation, No focal neurological deficit observed, normal motor observed, normal speech observed, normal coordination observed.    Psychiatric:  Cooperative, appropriate mood & affect.       Medical Decision Making   Rationale:  [place] Criteria    Age > 55:  WBC > 16,000:  Glu > 200:  LDH > 350:  AST > 250:    Total: 1 point.   Documents reviewed:  Emergency department nurses' notes, emergency department records, prior records, Reason For Exam  luq abd pain;Abdominal Pain    Radiology Report  Clinical History:  Abdominal pain     Technique:  Multidetector IV contrast enhanced axial CT images of the abdomen and  pelvis were obtained with coronal and sagittal reconstructions.      Automatic exposure control was utilized to reduce the patient's  radiation dose.     Comparison:  6/15/2012     Findings:     01. HEPATOBILIARY: No focal hepatic lesion is identified, The  gallbladder is normal.      02. SPLEEN: Normal     03. PANCREAS: No focal masses or ductal dilatation.     04. ADRENALS: No adrenal nodules.     05. KIDNEYS: The right kidney demonstrates no stone, hydronephrosis,  or hydroureter. No focal mass identified.The left kidney demonstrates  no stone, hydronephrosis, or hydroureter. No focal mass identified.     06. LYMPHADENOPATHY/RETROPERITONEUM: There is no retroperitoneal  lymphadenopathy. The abdominal aorta is normal in course and caliber.      07. BOWEL: No acute bowel related abnormalities. No evidence of  appendiceal inflammation.     08. PELVIC VISCERA: Normal. No pelvic mass.     09. PELVIC LYMPH NODES: No lymphadenopathy.     10. PERITONEUM/ABDOMINAL WALL: No ascites or implant.     11. SKELETAL: No aggressive appearing lytic/blastic lesion. No  acute  fractures, subluxations or dislocations.     12. LUNG BASES: The visualized lungs are unremarkable.     Impression  No acute abnormality identified within the abdomen and pelvis.       Signature Line  Electronically Signed By: Gallo Beal MD  Date/Time Signed: 12/15/2019 11:32, Reason For Exam  Pain    Radiology Report     INDICATION: Pain     TECHNIQUE: 8.3 mCi of intravenous Choletec was administered followed  by focused gamma camera imaging of the abdomen.  2 mcg of intravenous  CCK was administered by slow infusion for purposes of inducing  gallbladder contraction.     FINDINGS:     There is prompt hepatocellular uptake. Central biliary activity  identified with gallbladder filling by 20 minutes. There is  progressive gallbladder filling over 60 minutes. Small bowel activity  is identified by 45 minutes progressing through 60 minutes of imaging.        Region of interest was drawn over the gallbladder and counting  statistics used to determine the ejection fraction. Ejection fraction  was calculated to be 43%, above the normal range of greater than 35%.     IMPRESSION:     Normal hepatobiliary scintigraphy with normal gallbladder ejection  fraction.       Signature Line  Electronically Signed By: Alexis Muñiz MD  Date/Time Signed: 09/10/2019 14:07.    Electrocardiogram:  Time 12/18/2019 22:55:00, rate 81, normal sinus rhythm, normal TX & QRS intervals, EP Interp, Ectopy Frequent, premature ventricular contractions.    Results review:  Lab results : Lab View   12/18/2019 22:49 CST     POC Troponin              0.00 ng/mL    12/18/2019 22:48 CST     Potassium Lvl             3.8 mmol/L                             CO2                       30 mmol/L                             Glucose Lvl               150 mg/dL  HI                             BUN                       15 mg/dL                             Creatinine                0.70 mg/dL                             Bili Total                 0.3 mg/dL                             AST                       13 unit/L  LOW                             ALT                       30 unit/L                             Alk Phos                  91 unit/L                             Lipase Lvl                534 unit/L  HI                             Total CK                  70 unit/L                             CK MB                     <1.0 ng/mL  ,    No qualifying data available.       Reexamination/ Reevaluation   Vital signs   Basic Oxygen Information   12/18/2019 22:19 CST     SpO2                      98 %                             Oxygen Therapy            Room air        Impression and Plan   Diagnosis   Pancreatitis (SUM98-VQ K85.90)   Plan   Condition: Stable.    Disposition: Admit time  12/18/2019 23:37:00, Admit to Inpatient Unit.    Counseled: Patient, Regarding diagnosis, Regarding diagnostic results, Regarding treatment plan, Patient indicated understanding of instructions.

## 2022-04-30 NOTE — OP NOTE
DATE OF SURGERY:    01/10/2019    SURGEON:  Davi Lai MD    PREOPERATIVE DIAGNOSIS:  Ganglion cyst, carpal tunnel syndrome, right hand.    POSTOPERATIVE DIAGNOSIS:  Ganglion cyst, carpal tunnel syndrome, right hand.    PROCEDURE:    1. Removal of right-sided dorsal ganglion cyst.  2. Open carpal tunnel release, right hand.    INDICATIONS FOR PROCEDURE:  Ms. Abbi Amador has a longstanding history of carpal tunnel syndrome of her right hand, numbness and tingling of her hand.  She also has a dorsal ganglion cyst.  She presents for excision.    ANESTHESIA:  General.    COMPLICATIONS:  None.    PROCEDURE IN DETAIL:  The patient was endotracheally intubated, prepped and draped, in the usual sterile fashion.  Esmarch was used to exsanguinate the right upper extremity.  Tourniquet was inflated to 250 mmHg.  We first began by making a 2 cm incision over the transverse carpal ligament using a 15 blade scalpel.  Dissection was carried down to the level of the transverse carpal ligament.  This was incised using a 15 blade, and the ligament was released using tenotomy scissors, proximally and distally.  After this was completed, we turned our attention to the dorsal ganglion cyst.  A small incision, 1 cm, was made over the dorsal radial ganglion cyst.  This was readily apparent.  This was dissected out using tenotomy scissors.  The cyst was removed in its entirety.  This was sent to Pathology.  The wounds were then closed using interrupted 4-0 nylon sutures, and a local block was then performed using 1% lidocaine with epinephrine.  The sterile dressing was applied.  Tourniquet was released.  Fingers were pink at the end of the case.  There were no complications.  I was scrubbed and present for the entire procedure.      ______________________________  Davi Lai MD    /US  DD:  01/10/2019  Time:  07:51AM  DT:  01/10/2019  Time:  08:26AM  Job #:  [id number]

## 2022-05-02 ENCOUNTER — HOSPITAL ENCOUNTER (EMERGENCY)
Facility: HOSPITAL | Age: 62
Discharge: HOME | End: 2022-05-02
Attending: EMERGENCY MEDICINE
Payer: MEDICAID

## 2022-05-02 VITALS
DIASTOLIC BLOOD PRESSURE: 72 MMHG | HEART RATE: 57 BPM | TEMPERATURE: 98 F | OXYGEN SATURATION: 100 % | SYSTOLIC BLOOD PRESSURE: 142 MMHG | BODY MASS INDEX: 33.1 KG/M2 | WEIGHT: 179.88 LBS | HEIGHT: 62 IN | RESPIRATION RATE: 16 BRPM

## 2022-05-02 DIAGNOSIS — R11.0 NAUSEA: ICD-10-CM

## 2022-05-02 DIAGNOSIS — K21.9 GASTROESOPHAGEAL REFLUX DISEASE, UNSPECIFIED WHETHER ESOPHAGITIS PRESENT: ICD-10-CM

## 2022-05-02 DIAGNOSIS — K85.90 ACUTE RECURRENT PANCREATITIS: Primary | ICD-10-CM

## 2022-05-02 LAB
ALBUMIN SERPL-MCNC: 3.9 GM/DL (ref 3.4–4.8)
ALBUMIN/GLOB SERPL: 1 RATIO (ref 1.1–2)
ALP SERPL-CCNC: 78 UNIT/L (ref 40–150)
ALT SERPL-CCNC: 21 UNIT/L (ref 0–55)
AMYLASE SERPL-CCNC: 91 UNIT/L (ref 25–125)
APPEARANCE UR: CLEAR
AST SERPL-CCNC: 22 UNIT/L (ref 5–34)
BACTERIA #/AREA URNS AUTO: ABNORMAL /HPF
BASOPHILS # BLD AUTO: 0.04 X10(3)/MCL (ref 0–0.2)
BASOPHILS NFR BLD AUTO: 0.6 %
BILIRUB UR QL STRIP.AUTO: NEGATIVE MG/DL
BILIRUBIN DIRECT+TOT PNL SERPL-MCNC: 0.1 MG/DL (ref 0–0.5)
BILIRUBIN DIRECT+TOT PNL SERPL-MCNC: 0.3 MG/DL (ref 0–0.8)
BILIRUBIN DIRECT+TOT PNL SERPL-MCNC: 0.4 MG/DL
BUN SERPL-MCNC: 14.5 MG/DL (ref 9.8–20.1)
CALCIUM SERPL-MCNC: 9.9 MG/DL (ref 8.4–10.2)
CHLORIDE SERPL-SCNC: 102 MMOL/L (ref 98–107)
CO2 SERPL-SCNC: 27 MMOL/L (ref 23–31)
COLOR UR AUTO: COLORLESS
CREAT SERPL-MCNC: 0.67 MG/DL (ref 0.55–1.02)
CREAT SERPL-MCNC: 0.69 MG/DL (ref 0.55–1.02)
EOSINOPHIL # BLD AUTO: 0.09 X10(3)/MCL (ref 0–0.9)
EOSINOPHIL NFR BLD AUTO: 1.3 %
ERYTHROCYTE [DISTWIDTH] IN BLOOD BY AUTOMATED COUNT: 12.9 % (ref 11.5–17)
GLOBULIN SER-MCNC: 3.9 GM/DL (ref 2.4–3.5)
GLUCOSE SERPL-MCNC: 88 MG/DL (ref 82–115)
GLUCOSE UR QL STRIP.AUTO: NORMAL MG/DL
HCT VFR BLD AUTO: 40.5 % (ref 37–47)
HGB BLD-MCNC: 13.1 GM/DL (ref 12–16)
HYALINE CASTS #/AREA URNS LPF: ABNORMAL /LPF
IMM GRANULOCYTES # BLD AUTO: 0.01 X10(3)/MCL (ref 0–0.02)
IMM GRANULOCYTES NFR BLD AUTO: 0.1 % (ref 0–0.43)
KETONES UR QL STRIP.AUTO: NEGATIVE MG/DL
LEUKOCYTE ESTERASE UR QL STRIP.AUTO: NEGATIVE UNIT/L
LIPASE SERPL-CCNC: 49 U/L
LYMPHOCYTES # BLD AUTO: 3.25 X10(3)/MCL (ref 0.6–4.6)
LYMPHOCYTES NFR BLD AUTO: 46.3 %
MCH RBC QN AUTO: 27.8 PG (ref 27–31)
MCHC RBC AUTO-ENTMCNC: 32.3 MG/DL (ref 33–36)
MCV RBC AUTO: 85.8 FL (ref 80–94)
MONOCYTES # BLD AUTO: 0.56 X10(3)/MCL (ref 0.1–1.3)
MONOCYTES NFR BLD AUTO: 8 %
MUCOUS THREADS URNS QL MICRO: ABNORMAL /LPF
NEUTROPHILS # BLD AUTO: 3.1 X10(3)/MCL (ref 2.1–9.2)
NEUTROPHILS NFR BLD AUTO: 43.7 %
NITRITE UR QL STRIP.AUTO: NEGATIVE
NRBC BLD AUTO-RTO: 0 %
PH UR STRIP.AUTO: 6.5 [PH]
PLATELET # BLD AUTO: 256 X10(3)/MCL (ref 130–400)
PMV BLD AUTO: 11.7 FL (ref 9.4–12.4)
POTASSIUM SERPL-SCNC: 4.6 MMOL/L (ref 3.5–5.1)
PROT SERPL-MCNC: 7.8 GM/DL (ref 5.8–7.6)
PROT UR QL STRIP.AUTO: NEGATIVE MG/DL
RBC # BLD AUTO: 4.72 X10(6)/MCL (ref 4.2–5.4)
RBC UR QL AUTO: NEGATIVE UNIT/L
SODIUM SERPL-SCNC: 138 MMOL/L (ref 136–145)
SP GR UR STRIP.AUTO: 1.01
SQUAMOUS #/AREA URNS LPF: ABNORMAL /HPF
TROPONIN I SERPL-MCNC: <0.01 NG/ML (ref 0–0.04)
UROBILINOGEN UR STRIP-ACNC: 0.2 MG/DL
WBC # SPEC AUTO: 7 X10(3)/MCL (ref 4.5–11.5)
WBC #/AREA URNS AUTO: ABNORMAL /HPF

## 2022-05-02 PROCEDURE — 96361 HYDRATE IV INFUSION ADD-ON: CPT

## 2022-05-02 PROCEDURE — 25500020 PHARM REV CODE 255: Performed by: PHYSICIAN ASSISTANT

## 2022-05-02 PROCEDURE — 63600175 PHARM REV CODE 636 W HCPCS: Performed by: PHYSICIAN ASSISTANT

## 2022-05-02 PROCEDURE — 85025 COMPLETE CBC W/AUTO DIFF WBC: CPT | Performed by: PHYSICIAN ASSISTANT

## 2022-05-02 PROCEDURE — 25000003 PHARM REV CODE 250: Performed by: PHYSICIAN ASSISTANT

## 2022-05-02 PROCEDURE — 96375 TX/PRO/DX INJ NEW DRUG ADDON: CPT

## 2022-05-02 PROCEDURE — 84484 ASSAY OF TROPONIN QUANT: CPT | Performed by: PHYSICIAN ASSISTANT

## 2022-05-02 PROCEDURE — 82150 ASSAY OF AMYLASE: CPT | Performed by: PHYSICIAN ASSISTANT

## 2022-05-02 PROCEDURE — 96374 THER/PROPH/DIAG INJ IV PUSH: CPT | Mod: 59

## 2022-05-02 PROCEDURE — 99285 EMERGENCY DEPT VISIT HI MDM: CPT | Mod: 25

## 2022-05-02 PROCEDURE — 82565 ASSAY OF CREATININE: CPT | Performed by: PHYSICIAN ASSISTANT

## 2022-05-02 PROCEDURE — 96376 TX/PRO/DX INJ SAME DRUG ADON: CPT

## 2022-05-02 PROCEDURE — 81001 URINALYSIS AUTO W/SCOPE: CPT | Performed by: PHYSICIAN ASSISTANT

## 2022-05-02 PROCEDURE — 83690 ASSAY OF LIPASE: CPT | Performed by: PHYSICIAN ASSISTANT

## 2022-05-02 PROCEDURE — 36415 COLL VENOUS BLD VENIPUNCTURE: CPT | Performed by: PHYSICIAN ASSISTANT

## 2022-05-02 PROCEDURE — 80053 COMPREHEN METABOLIC PANEL: CPT | Performed by: PHYSICIAN ASSISTANT

## 2022-05-02 RX ORDER — MORPHINE SULFATE 2 MG/ML
4 INJECTION, SOLUTION INTRAMUSCULAR; INTRAVENOUS
Status: COMPLETED | OUTPATIENT
Start: 2022-05-02 | End: 2022-05-02

## 2022-05-02 RX ORDER — HYOSCYAMINE SULFATE 0.125 MG
TABLET ORAL
Status: DISCONTINUED
Start: 2022-05-02 | End: 2022-05-02 | Stop reason: HOSPADM

## 2022-05-02 RX ORDER — MAG HYDROX/ALUMINUM HYD/SIMETH 200-200-20
5 SUSPENSION, ORAL (FINAL DOSE FORM) ORAL
Status: COMPLETED | OUTPATIENT
Start: 2022-05-02 | End: 2022-05-02

## 2022-05-02 RX ORDER — HYOSCYAMINE SULFATE 0.12 MG/1
0.12 TABLET SUBLINGUAL
Status: COMPLETED | OUTPATIENT
Start: 2022-05-02 | End: 2022-05-02

## 2022-05-02 RX ORDER — FAMOTIDINE 10 MG/ML
20 INJECTION INTRAVENOUS ONCE
Status: COMPLETED | OUTPATIENT
Start: 2022-05-02 | End: 2022-05-02

## 2022-05-02 RX ORDER — ONDANSETRON 2 MG/ML
4 INJECTION INTRAMUSCULAR; INTRAVENOUS
Status: COMPLETED | OUTPATIENT
Start: 2022-05-02 | End: 2022-05-02

## 2022-05-02 RX ORDER — MORPHINE SULFATE 2 MG/ML
4 INJECTION, SOLUTION INTRAMUSCULAR; INTRAVENOUS ONCE
Status: COMPLETED | OUTPATIENT
Start: 2022-05-02 | End: 2022-05-02

## 2022-05-02 RX ORDER — LIDOCAINE HYDROCHLORIDE 20 MG/ML
5 SOLUTION OROPHARYNGEAL
Status: COMPLETED | OUTPATIENT
Start: 2022-05-02 | End: 2022-05-02

## 2022-05-02 RX ORDER — ONDANSETRON 4 MG/1
4 TABLET, FILM COATED ORAL EVERY 8 HOURS PRN
Qty: 20 TABLET | Refills: 0 | Status: SHIPPED | OUTPATIENT
Start: 2022-05-02

## 2022-05-02 RX ORDER — HYOSCYAMINE SULFATE 0.125 MG
125 TABLET ORAL EVERY 6 HOURS PRN
Qty: 20 TABLET | Refills: 0 | Status: SHIPPED | OUTPATIENT
Start: 2022-05-02

## 2022-05-02 RX ADMIN — ALUMINUM HYDROXIDE, MAGNESIUM HYDROXIDE, AND SIMETHICONE 5 ML: 200; 200; 20 SUSPENSION ORAL at 06:05

## 2022-05-02 RX ADMIN — FAMOTIDINE 20 MG: 10 INJECTION INTRAVENOUS at 05:05

## 2022-05-02 RX ADMIN — SODIUM CHLORIDE 1000 ML: 9 INJECTION, SOLUTION INTRAVENOUS at 01:05

## 2022-05-02 RX ADMIN — MORPHINE SULFATE 4 MG: 2 INJECTION, SOLUTION INTRAMUSCULAR; INTRAVENOUS at 01:05

## 2022-05-02 RX ADMIN — IOPAMIDOL 100 ML: 755 INJECTION, SOLUTION INTRAVENOUS at 05:05

## 2022-05-02 RX ADMIN — HYOSCYAMINE SULFATE 0.12 MG: 0.12 TABLET SUBLINGUAL at 06:05

## 2022-05-02 RX ADMIN — LIDOCAINE HYDROCHLORIDE 5 ML: 20 SOLUTION ORAL at 06:05

## 2022-05-02 RX ADMIN — ONDANSETRON 4 MG: 2 INJECTION INTRAMUSCULAR; INTRAVENOUS at 01:05

## 2022-05-02 RX ADMIN — MORPHINE SULFATE 4 MG: 2 INJECTION, SOLUTION INTRAMUSCULAR; INTRAVENOUS at 05:05

## 2022-05-02 NOTE — ED PROVIDER NOTES
"Encounter Date: 5/2/2022       History     Chief Complaint   Patient presents with    Abdominal Pain    Nausea     Pt to ed c/o left upper quadrant pain with nausea x 6 days. Also reports strong smelling urine and states stools are now chalky but states she has been taking Mylanta. Hx of Gerd. Takes Protonix     Patient is a 61 year old female with a hx of GERD and DM, who presents to ED with left upper quad abdominal pain with nausea, and " strong smelling urine" x 6 days.  Patient denies vomiting, fever, SOB, CP, diarrhea.      The history is provided by the patient.   Abdominal Pain  Illness onset: 6 days. The onset of the illness was gradual. The abdominal pain is located in the LUQ and epigastric region. The abdominal pain does not radiate. The severity of the abdominal pain is 6/10. The other symptoms of the illness include nausea. The other symptoms of the illness do not include fever, shortness of breath, vomiting, diarrhea, vaginal discharge or vaginal bleeding.   Nausea began 6 to 7 days ago.   Additional symptoms associated with the illness include heartburn. Symptoms associated with the illness do not include chills or back pain. Significant associated medical issues include GERD and diabetes.     Review of patient's allergies indicates:   Allergen Reactions    Nsaids (non-steroidal anti-inflammatory drug) Anaphylaxis    Sulfa (sulfonamide antibiotics) Hives    Aspirin Other (See Comments)     Kidney bleeding     Past Medical History:   Diagnosis Date    Diabetes mellitus     GERD (gastroesophageal reflux disease)     Hypertension      No past surgical history on file.  No family history on file.  Social History     Tobacco Use    Smoking status: Current Some Day Smoker     Types: Cigarettes    Smokeless tobacco: Never Used   Substance Use Topics    Alcohol use: Never    Drug use: Never     Review of Systems   Constitutional: Negative for chills and fever.   HENT: Negative.    Respiratory: " Negative for chest tightness and shortness of breath.    Cardiovascular: Negative for chest pain and palpitations.   Gastrointestinal: Positive for abdominal pain (LQU, epigastric), heartburn and nausea. Negative for diarrhea and vomiting.   Genitourinary: Negative for flank pain, vaginal bleeding, vaginal discharge and vaginal pain.   Musculoskeletal: Negative for back pain and myalgias.   Skin: Negative for rash.   Neurological: Negative for dizziness, light-headedness and headaches.       Physical Exam     Initial Vitals [05/02/22 1209]   BP Pulse Resp Temp SpO2   (!) 148/82 93 16 98.1 °F (36.7 °C) 100 %      MAP       --         Physical Exam    Nursing note and vitals reviewed.  Constitutional: She appears well-developed and well-nourished.   Eyes: Conjunctivae are normal.   Neck:   Normal range of motion.  Cardiovascular: Normal rate.   Pulmonary/Chest: Breath sounds normal. She exhibits no tenderness.   Abdominal: Abdomen is soft. Bowel sounds are normal. There is abdominal tenderness. There is no rebound and no guarding.   Musculoskeletal:         General: Normal range of motion.      Cervical back: Normal range of motion.     Skin: Skin is warm. Capillary refill takes less than 2 seconds.         ED Course   Procedures  Labs Reviewed   COMPREHENSIVE METABOLIC PANEL - Abnormal; Notable for the following components:       Result Value    Protein Total 7.8 (*)     Globulin 3.9 (*)     Albumin/Globulin Ratio 1.0 (*)     All other components within normal limits   URINALYSIS, REFLEX TO URINE CULTURE - Abnormal; Notable for the following components:    Squamous Epithelial Cells, UA Trace (*)     Mucous, UA Trace (*)     All other components within normal limits    Narrative:     RBC 0-5   REFERENCE RANGE: <6-10   CBC WITH DIFFERENTIAL - Abnormal; Notable for the following components:    MCHC 32.3 (*)     All other components within normal limits   AMYLASE - Normal   LIPASE - Normal   TROPONIN I - Normal   CBC W/  AUTO DIFFERENTIAL    Narrative:     The following orders were created for panel order CBC auto differential.  Procedure                               Abnormality         Status                     ---------                               -----------         ------                     CBC with Differential[[id number]]        Abnormal            Final result                 Please view results for these tests on the individual orders.   CREATININE, SERUM          Imaging Results          CT Abdomen Pelvis With Contrast (Final result)  Result time 05/02/22 17:48:42    Final result by Jairon Ryan MD (05/02/22 17:48:42)                 Impression:      1. Possible mild acute pancreatitis.  2. Colonic diverticulosis with no definitive findings for diverticulitis.      Electronically signed by: Jairon Ryan  Date:    05/02/2022  Time:    17:48             Narrative:    EXAMINATION:  CT ABDOMEN PELVIS WITH CONTRAST    CLINICAL HISTORY:  Abdominal pain, acute, nonlocalized;    TECHNIQUE:  CT imaging of the abdomen and pelvis after the administration of intravenous contrast. Dose length product is 460 mGycm. Automatic exposure control, adjustment of mA/kV or iterative reconstruction technique used to limit radiation dose.    COMPARISON:  CT abdomen/pelvis 12/15/2019    FINDINGS:  Liver/biliary: Normal liver.  No radiodense gallstones. No intra or extrahepatic biliary ductal dilation.    Pancreas: Possible mild stranding along the pancreatic tail.  No pancreatic ductal dilatation or defined pancreatic necrosis.    Spleen: Normal.    Adrenals: Normal.    Genitourinary: Symmetric renal enhancement. No hydronephrosis. Bladder within normal limits. Uterus not seen.  No adnexal mass.    Stomach/bowel: No evidence of bowel obstruction. Normal appendix. Colonic diverticulosis with no discernible pericolonic inflammation.    Lymph nodes: No pathologically enlarged lymph node identified.    Peritoneum: No ascites or free air. No  fluid collection.    Vasculature: Mild aortic and iliac artery calcifications.  No abdominal aortic aneurysm.    Abdominal wall: Normal.    Lung bases: No consolidation or pleural effusion.    Musculoskeletal: No acute osseous findings.                                 Medications   hyoscyamine (ANASPAZ,LEVSIN) 0.125 mg tablet (  Back Association 5/2/22 1830)   aluminum-magnesium hydroxide 200-200 mg/5 mL suspension (  Back Association 5/2/22 1830)   sodium chloride 0.9% bolus 1,000 mL (0 mLs Intravenous Stopped 5/2/22 1400)   morphine injection 4 mg (4 mg Intravenous Given 5/2/22 1300)   ondansetron injection 4 mg (4 mg Intravenous Given 5/2/22 1300)   morphine injection 4 mg (4 mg Intravenous Given 5/2/22 1734)   famotidine (PF) injection 20 mg (20 mg Intravenous Given 5/2/22 1733)   iopamidoL (ISOVUE-370) injection 100 mL (100 mLs Intravenous Given 5/2/22 1745)   hyoscyamine SL tablet 0.125 mg (0.125 mg Oral Given 5/2/22 1815)   aluminum-magnesium hydroxide-simethicone 200-200-20 mg/5 mL suspension 5 mL (5 mLs Oral Given 5/2/22 1815)   LIDOcaine HCl 2% oral solution 5 mL (5 mLs Oral Given 5/2/22 1815)     Medical Decision Making:   Clinical Tests:   Lab Tests: Ordered and Reviewed  The following lab test(s) were unremarkable: CBC, CMP, Lipase, Troponin and Urinalysis  Radiological Study: Ordered and Reviewed             ED Course as of 05/02/22 1908   Mon May 02, 2022   1743 Troponin I: <0.010 [ER]   1743 Lipase: 49 [ER]   1743 Amylase: 91 [ER]   1759 CT Abdomen Pelvis With Contrast    1. Possible mild acute pancreatitis.  2. Colonic diverticulosis with no definitive findings for diverticulitis [ER]   1814 Patient states she has been almost NPO for most of the past 6 days.  She eats crackers with peanut butter, however not much at a time.  She takes medication daily for her acid reflux.  She has an apt with her pcp this week and will call her GI doctor tomorrow to inform of ED visit.  Patient understands her test  results and will return to ED immediately if needed.   [ER]      ED Course User Index  [ER] GREGORIO Johnson             Clinical Impression:   Final diagnoses:  [K85.90] Acute recurrent pancreatitis (Primary)  [R11.0] Nausea  [K21.9] Gastroesophageal reflux disease, unspecified whether esophagitis present          ED Disposition Condition    Discharge Stable        ED Prescriptions     Medication Sig Dispense Start Date End Date Auth. Provider    hyoscyamine (ANASPAZ,LEVSIN) 0.125 mg Tab Take 1 tablet (125 mcg total) by mouth every 6 (six) hours as needed (abdominal cramping). 20 tablet 5/2/2022  GREGORIO Johnson    ondansetron (ZOFRAN) 4 MG tablet Take 1 tablet (4 mg total) by mouth every 8 (eight) hours as needed for Nausea. 20 tablet 5/2/2022  GREGORIO Johnson        Follow-up Information     Follow up With Specialties Details Why Contact Info    Aby Rodriguez MD Family Medicine  Keep scheduled apt for this week [address] [facility]  [address]  [place] [address]  [phone]      [facility] Emergency Medicine  As needed, If symptoms worsen [address] [facility] [address] [phone]           GREGORIO Johnson  05/02/22 5731

## 2023-01-27 DIAGNOSIS — M79.642 BILATERAL HAND PAIN: Primary | ICD-10-CM

## 2023-01-27 DIAGNOSIS — M79.641 BILATERAL HAND PAIN: Primary | ICD-10-CM

## 2023-04-26 ENCOUNTER — HOSPITAL ENCOUNTER (OUTPATIENT)
Dept: RADIOLOGY | Facility: HOSPITAL | Age: 63
Discharge: HOME | End: 2023-04-26
Attending: NURSE PRACTITIONER
Payer: MEDICAID

## 2023-04-26 ENCOUNTER — OFFICE VISIT (OUTPATIENT)
Dept: ORTHOPEDICS | Facility: CLINIC | Age: 63
End: 2023-04-26
Payer: MEDICAID

## 2023-04-26 VITALS — BODY MASS INDEX: 33.6 KG/M2 | WEIGHT: 196.81 LBS | HEIGHT: 64 IN

## 2023-04-26 DIAGNOSIS — M79.642 BILATERAL HAND PAIN: ICD-10-CM

## 2023-04-26 DIAGNOSIS — G56.03 BILATERAL CARPAL TUNNEL SYNDROME: Primary | ICD-10-CM

## 2023-04-26 DIAGNOSIS — M79.641 BILATERAL HAND PAIN: ICD-10-CM

## 2023-04-26 PROCEDURE — 99215 OFFICE O/P EST HI 40 MIN: CPT | Mod: S$PBB,,, | Performed by: NURSE PRACTITIONER

## 2023-04-26 PROCEDURE — 4010F ACE/ARB THERAPY RXD/TAKEN: CPT | Mod: CPTII,,, | Performed by: NURSE PRACTITIONER

## 2023-04-26 PROCEDURE — 99214 OFFICE O/P EST MOD 30 MIN: CPT | Mod: PBBFAC | Performed by: NURSE PRACTITIONER

## 2023-04-26 PROCEDURE — 1160F RVW MEDS BY RX/DR IN RCRD: CPT | Mod: CPTII,,, | Performed by: NURSE PRACTITIONER

## 2023-04-26 PROCEDURE — 73130 X-RAY EXAM OF HAND: CPT | Mod: TC,LT

## 2023-04-26 PROCEDURE — 73130 X-RAY EXAM OF HAND: CPT | Mod: TC,RT

## 2023-04-26 PROCEDURE — 1159F MED LIST DOCD IN RCRD: CPT | Mod: CPTII,,, | Performed by: NURSE PRACTITIONER

## 2023-04-26 PROCEDURE — 1160F PR REVIEW ALL MEDS BY PRESCRIBER/CLIN PHARMACIST DOCUMENTED: ICD-10-PCS | Mod: CPTII,,, | Performed by: NURSE PRACTITIONER

## 2023-04-26 PROCEDURE — 99215 PR OFFICE/OUTPT VISIT, EST, LEVL V, 40-54 MIN: ICD-10-PCS | Mod: S$PBB,,, | Performed by: NURSE PRACTITIONER

## 2023-04-26 PROCEDURE — 1159F PR MEDICATION LIST DOCUMENTED IN MEDICAL RECORD: ICD-10-PCS | Mod: CPTII,,, | Performed by: NURSE PRACTITIONER

## 2023-04-26 PROCEDURE — 3008F PR BODY MASS INDEX (BMI) DOCUMENTED: ICD-10-PCS | Mod: CPTII,,, | Performed by: NURSE PRACTITIONER

## 2023-04-26 PROCEDURE — 3008F BODY MASS INDEX DOCD: CPT | Mod: CPTII,,, | Performed by: NURSE PRACTITIONER

## 2023-04-26 PROCEDURE — 4010F PR ACE/ARB THEARPY RXD/TAKEN: ICD-10-PCS | Mod: CPTII,,, | Performed by: NURSE PRACTITIONER

## 2023-04-26 RX ORDER — GABAPENTIN 300 MG/1
300 CAPSULE ORAL 3 TIMES DAILY
COMMUNITY

## 2023-04-26 RX ORDER — CYCLOBENZAPRINE HCL 10 MG
TABLET ORAL
COMMUNITY

## 2023-04-26 RX ORDER — NETARSUDIL AND LATANOPROST OPHTHALMIC SOLUTION, 0.02%/0.005% .2; .05 MG/ML; MG/ML
1 SOLUTION/ DROPS OPHTHALMIC; TOPICAL NIGHTLY
COMMUNITY
Start: 2022-12-01

## 2023-04-26 RX ORDER — BRIMONIDINE TARTRATE, TIMOLOL MALEATE 2; 5 MG/ML; MG/ML
1 SOLUTION/ DROPS OPHTHALMIC 2 TIMES DAILY
COMMUNITY
Start: 2023-03-21

## 2023-04-26 RX ORDER — ESOMEPRAZOLE MAGNESIUM 40 MG/1
40 CAPSULE, DELAYED RELEASE ORAL
COMMUNITY
Start: 2023-04-15

## 2023-04-26 RX ORDER — DULOXETIN HYDROCHLORIDE 30 MG/1
1 CAPSULE, DELAYED RELEASE ORAL DAILY
COMMUNITY
Start: 2022-11-28

## 2023-04-26 RX ORDER — OXYCODONE AND ACETAMINOPHEN 5; 325 MG/1; MG/1
1 TABLET ORAL 2 TIMES DAILY
COMMUNITY
Start: 2023-02-07

## 2023-04-26 RX ORDER — LANCETS
EACH MISCELLANEOUS
COMMUNITY
Start: 2023-01-26

## 2023-04-26 RX ORDER — LOSARTAN POTASSIUM 100 MG/1
100 TABLET ORAL
COMMUNITY
Start: 2023-02-10

## 2023-04-26 RX ORDER — ROSUVASTATIN CALCIUM 10 MG/1
10 TABLET, COATED ORAL NIGHTLY
COMMUNITY
Start: 2023-04-24

## 2023-04-26 RX ORDER — AMLODIPINE BESYLATE 5 MG/1
5 TABLET ORAL EVERY MORNING
COMMUNITY
Start: 2023-02-21

## 2023-04-26 RX ORDER — SITAGLIPTIN 100 MG/1
100 TABLET, FILM COATED ORAL EVERY MORNING
COMMUNITY
Start: 2023-01-26

## 2023-04-26 NOTE — PROGRESS NOTES
Subjective:   PATIENT ID: Abbi Amador is a 62 y.o. female. Non-smoker. Employment HX: , currently employed.   Currently on workmen's comp for spine issues.  Seen [facility] ortho for same DX since 2019.   CHIEF COMPLAINT: Pain of the Right Hand and Pain of the Left Hand    HPI:    Bilateral L > R aching and numb diffuse hand and wrist pain. Right dominate  Injury: no known injury  Onset: several years ago worsening over time  Modifying Factors: worse with activity, improved with rest, radiates to wrist, radiates into forearm, increased with shaking hands, increased with lifting, and increased pain at PM  Associated Symptoms: numbness, decreased  strength, decreased ROM, difficulty sleeping s/t pain, and weakness with dropping items  Activity: sedentary with light activity and pain moderately interferes with ADLs .   Previous Treatments: nocturnal wrist splint, HEP , RX NSAIDs, RX gabapentin, CSI since 2020 last injection 6/2022, RX OT completed 6 wks/ 3 xs per week d/c'd 2019, and surgical treatment 2019 RIGHT carpal tunnel release  without significant relief.  PMH: + DM  and allergy to NSAIDs  Family History: + OA    NOTE: Existing patient, new to me  seen in [id number] for same.  In prior visit 2/2022 referred for EMG study with Dr. Francois.  RTC today reporting she was had EMG completed and was seen by Dr. Perez following study where she received bilateral carpal tunnel injections with good relief lasting 2-3 months but symptoms returning and worsening since.  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  Denies injury.  Patient desires surgical treatment options due to symptoms affecting ADLs.     Current Outpatient Medications:     amLODIPine (NORVASC) 5 MG tablet, Take 5 mg by mouth every morning., Disp: , Rfl:     COMBIGAN 0.2-0.5 % Drop, Place 1 drop into both eyes 2 (two) times daily., Disp: , Rfl:     cyclobenzaprine  (FLEXERIL) 10 MG tablet, cyclobenzaprine Take No date recorded No form recorded No frequency recorded No route recorded No set duration recorded No set duration amount recorded active No dosage strength recorded No dosage strength units of measure recorded, Disp: , Rfl:     DULoxetine (CYMBALTA) 30 MG capsule, Take 1 capsule by mouth once daily., Disp: , Rfl:     esomeprazole (NEXIUM) 40 MG capsule, Take 40 mg by mouth., Disp: , Rfl:     gabapentin (NEURONTIN) 300 MG capsule, Take 300 mg by mouth 3 (three) times daily., Disp: , Rfl:     hyoscyamine (ANASPAZ,LEVSIN) 0.125 mg Tab, Take 1 tablet (125 mcg total) by mouth every 6 (six) hours as needed (abdominal cramping)., Disp: 20 tablet, Rfl: 0    JANUVIA 100 mg Tab, Take 100 mg by mouth every morning., Disp: , Rfl:     lancets Misc, Test once daily, Disp: , Rfl:     losartan (COZAAR) 100 MG tablet, Take 100 mg by mouth., Disp: , Rfl:     ondansetron (ZOFRAN) 4 MG tablet, Take 1 tablet (4 mg total) by mouth every 8 (eight) hours as needed for Nausea., Disp: 20 tablet, Rfl: 0    oxyCODONE-acetaminophen (PERCOCET) 5-325 mg per tablet, Take 1 tablet by mouth 2 (two) times daily., Disp: , Rfl:     ROCKLATAN 0.02-0.005 % Drop, Place 1 drop into both eyes every evening., Disp: , Rfl:     rosuvastatin (CRESTOR) 10 MG tablet, Take 10 mg by mouth every evening., Disp: , Rfl:   Review of patient's allergies indicates:   Allergen Reactions    Nsaids (non-steroidal anti-inflammatory drug) Anaphylaxis    Sulfa (sulfonamide antibiotics) Hives    Aspirin Other (See Comments)     Kidney bleeding     Hemoglobin A1C   Date Value Ref Range Status   01/19/2023 6.8 (H) 4.8 - 5.6 % Final     Comment:              Prediabetes: 5.7 - 6.4           Diabetes: >6.4           Glycemic control for adults with diabetes: <7.0   07/12/2022 6.6 (H) 4.8 - 5.6 % Final     Comment:              Prediabetes: 5.7 - 6.4           Diabetes: >6.4           Glycemic control for adults with  "diabetes: <7.0   03/07/2022 6.5 (H) 4.8 - 5.6 % Final      Body mass index is 33.78 kg/m².   Vitals:    04/26/23 1056 04/26/23 1057   Weight: 89.3 kg (196 lb 12.8 oz)    Height: 5' 4" (1.626 m)    PainSc:    9      REVIEW OF SYSTEMS:  A ten-point review of systems was performed and is negative, except as mentioned above   Objective:   MSK Bilateral Hand/ Wrist  Limited testing / exam due to patient c/o severe pain with even light touch and slight movement.   General:  no apparent distress, no pain indicators, obesity  Inspection:  no masses/cyst/nodules, no swelling, no erythema, no contusion and no nodule, noted healed incision scar to right wrist  Palpation: tenderness global, normal temperature  ROM   Unable to test s/t pain with palpation / movement   Strength: unable to test due to pain with palpation / movement     Special Testing:  Unable to test s/t pain with palpation / movement  Neuro/ Vascular: Spurling's Test negative, intact to light touch, capillary refill 2 seconds,  radial pulse equal 2+, 2 point discrimination intact  Neuro/ Psych: Awake, alert, oriented, normal mood and affect  Lymphatic: No LAD  Skin/ Soft Tissue: no rash, skin intact  Assessment:   IMAGING: X-ray 3 views of right and left hand ordered, reviewed and independently interpreted by me. Discussed with patient. No acute findings .  Some widening L>R of scapholunate noted bilaterally.    Awaiting radiologist findings.     EMG study dated 6/2/22 per Dr. Francois  Normal electrodiagnostic study of bilateral lower extremities consistent with moderate left median neuropathy at the wrist, carpal tunnel syndrome.  Mild left ulnar neuropathy at the wrist.  Mild left ulnar neuropathy at the elbow.  Moderate right median neuropathy at the wrist, carpal tunnel syndrome.  Mild right ulnar neuropathy at the wrist.  Sensory motor polyneuropathy.    EMR REVIEW: completed with noted Referral documentation reviewed    DIAGNOSIS:  1. Bilateral " carpal tunnel syndrome    2. BMI 33.0-33.9,adult       Plan:     Orders Placed This Encounter    X-Ray Hand Complete Right    X-Ray Hand Complete Left     Ongoing education about DX and treatment recommendations including conservative treatments of daily HEP for carpal tunnel, nocturnal splinting of wrist PRN and OTC NSAID as needed according to label instructions if able to tolerate.   Treatment plan: Severe exacerbation of chronic Bilateral R = L Carpal tunnel syndrome moderate bilateral, Ulnar tunnel syndrome at the wrist bilateral mild, Ulnar tunnel syndrome at the elbow mild left, and Polyneuropathy Patient will be referred to ortho res. for surgical eval. and treatment of carpal tunnel s/t failed conservative treatments.  Procedure: n/a  RX Medications: continue medications as RX per PCP . RX gabapentin as directed, medication precautions given   RTC: next available appt. with ortho res.  on Dr. Isaacs clinic day    Kathy García FNPAPA    Timed Billing Note  Total Time Spent with Patient: 40 minutes .  Visit Start Time: 1030  10 minutes spent prior to visit reviewing EMR, prior labs and x-rays.  20 minutes spent in visit with patient face-to-face time completing exam, obtaining history, educating on DX and treatment plan.  10 minutes spent after visit completing EMR documentation.   Visit End Time: 1110

## 2023-06-19 ENCOUNTER — OFFICE VISIT (OUTPATIENT)
Dept: ORTHOPEDICS | Facility: CLINIC | Age: 63
End: 2023-06-19
Payer: MEDICAID

## 2023-06-19 ENCOUNTER — HOSPITAL ENCOUNTER (OUTPATIENT)
Dept: RADIOLOGY | Facility: HOSPITAL | Age: 63
Discharge: HOME | End: 2023-06-19
Attending: STUDENT IN AN ORGANIZED HEALTH CARE EDUCATION/TRAINING PROGRAM
Payer: MEDICAID

## 2023-06-19 VITALS — BODY MASS INDEX: 33.46 KG/M2 | HEIGHT: 64 IN | WEIGHT: 196 LBS

## 2023-06-19 DIAGNOSIS — G56.03 BILATERAL CARPAL TUNNEL SYNDROME: ICD-10-CM

## 2023-06-19 DIAGNOSIS — M65.342 TRIGGER RING FINGER OF LEFT HAND: ICD-10-CM

## 2023-06-19 DIAGNOSIS — M65.4 DE QUERVAIN'S TENOSYNOVITIS, LEFT: ICD-10-CM

## 2023-06-19 DIAGNOSIS — G56.03 BILATERAL CARPAL TUNNEL SYNDROME: Primary | ICD-10-CM

## 2023-06-19 PROCEDURE — 73110 X-RAY EXAM OF WRIST: CPT | Mod: TC,LT

## 2023-06-19 PROCEDURE — 99213 OFFICE O/P EST LOW 20 MIN: CPT | Mod: PBBFAC,25

## 2023-06-19 PROCEDURE — 20550 NJX 1 TENDON SHEATH/LIGAMENT: CPT | Mod: PBBFAC,50 | Performed by: STUDENT IN AN ORGANIZED HEALTH CARE EDUCATION/TRAINING PROGRAM

## 2023-06-19 PROCEDURE — 73110 X-RAY EXAM OF WRIST: CPT | Mod: TC,RT

## 2023-06-19 PROCEDURE — 4010F ACE/ARB THERAPY RXD/TAKEN: CPT | Mod: CPTII,,, | Performed by: STUDENT IN AN ORGANIZED HEALTH CARE EDUCATION/TRAINING PROGRAM

## 2023-06-19 PROCEDURE — 20550 NJX 1 TENDON SHEATH/LIGAMENT: CPT | Mod: S$PBB,50,, | Performed by: STUDENT IN AN ORGANIZED HEALTH CARE EDUCATION/TRAINING PROGRAM

## 2023-06-19 PROCEDURE — 99213 OFFICE O/P EST LOW 20 MIN: CPT | Mod: S$PBB,25,, | Performed by: STUDENT IN AN ORGANIZED HEALTH CARE EDUCATION/TRAINING PROGRAM

## 2023-06-19 PROCEDURE — 1159F PR MEDICATION LIST DOCUMENTED IN MEDICAL RECORD: ICD-10-PCS | Mod: CPTII,,, | Performed by: STUDENT IN AN ORGANIZED HEALTH CARE EDUCATION/TRAINING PROGRAM

## 2023-06-19 PROCEDURE — 3008F PR BODY MASS INDEX (BMI) DOCUMENTED: ICD-10-PCS | Mod: CPTII,,, | Performed by: STUDENT IN AN ORGANIZED HEALTH CARE EDUCATION/TRAINING PROGRAM

## 2023-06-19 PROCEDURE — 4010F PR ACE/ARB THEARPY RXD/TAKEN: ICD-10-PCS | Mod: CPTII,,, | Performed by: STUDENT IN AN ORGANIZED HEALTH CARE EDUCATION/TRAINING PROGRAM

## 2023-06-19 PROCEDURE — 3008F BODY MASS INDEX DOCD: CPT | Mod: CPTII,,, | Performed by: STUDENT IN AN ORGANIZED HEALTH CARE EDUCATION/TRAINING PROGRAM

## 2023-06-19 PROCEDURE — 20550: ICD-10-PCS | Mod: S$PBB,50,, | Performed by: STUDENT IN AN ORGANIZED HEALTH CARE EDUCATION/TRAINING PROGRAM

## 2023-06-19 PROCEDURE — 20550 NJX 1 TENDON SHEATH/LIGAMENT: CPT | Mod: PBBFAC,RT | Performed by: STUDENT IN AN ORGANIZED HEALTH CARE EDUCATION/TRAINING PROGRAM

## 2023-06-19 PROCEDURE — 20550 NJX 1 TENDON SHEATH/LIGAMENT: CPT | Mod: PBBFAC,LT | Performed by: STUDENT IN AN ORGANIZED HEALTH CARE EDUCATION/TRAINING PROGRAM

## 2023-06-19 PROCEDURE — 99213 PR OFFICE/OUTPT VISIT, EST, LEVL III, 20-29 MIN: ICD-10-PCS | Mod: S$PBB,25,, | Performed by: STUDENT IN AN ORGANIZED HEALTH CARE EDUCATION/TRAINING PROGRAM

## 2023-06-19 PROCEDURE — 1159F MED LIST DOCD IN RCRD: CPT | Mod: CPTII,,, | Performed by: STUDENT IN AN ORGANIZED HEALTH CARE EDUCATION/TRAINING PROGRAM

## 2023-06-19 RX ORDER — LIDOCAINE HYDROCHLORIDE 10 MG/ML
1 INJECTION, SOLUTION EPIDURAL; INFILTRATION; INTRACAUDAL; PERINEURAL
Status: DISPENSED | OUTPATIENT
Start: 2023-06-19

## 2023-06-19 RX ORDER — TRIAMCINOLONE ACETONIDE 40 MG/ML
40 INJECTION, SUSPENSION INTRA-ARTICULAR; INTRAMUSCULAR
Status: DISPENSED | OUTPATIENT
Start: 2023-06-19

## 2023-06-19 RX ORDER — LIDOCAINE HYDROCHLORIDE 10 MG/ML
2 INJECTION, SOLUTION EPIDURAL; INFILTRATION; INTRACAUDAL; PERINEURAL
Status: DISCONTINUED | OUTPATIENT
Start: 2023-06-19 | End: 2023-06-19

## 2023-06-19 RX ORDER — TRIAMCINOLONE ACETONIDE 40 MG/ML
40 INJECTION, SUSPENSION INTRA-ARTICULAR; INTRAMUSCULAR
Status: COMPLETED | OUTPATIENT
Start: 2023-06-19 | End: 2023-06-19

## 2023-06-19 RX ORDER — TRIAMCINOLONE ACETONIDE 40 MG/ML
40 INJECTION, SUSPENSION INTRA-ARTICULAR; INTRAMUSCULAR
Status: DISCONTINUED | OUTPATIENT
Start: 2023-06-19 | End: 2023-06-19

## 2023-06-19 NOTE — PROGRESS NOTES
[facility] Orthopaedic Surgery Clinic Note      HPI:  62 y.o. female diabetes, hypertension presents to clinic today for bilateral wrist and hand pain.  Patient reports diffuse bilateral wrist and hand pain that has progressively worsened over the past several months.  She was referred to our clinic for concern for SLAC wrist with possible SL widening left greater than right.  She also received an EMG demonstrating moderate bilateral carpal tunnel and mild bilateral ulnar neuropathy.  She does complain of bilateral numbness and tingling mostly in the median nerve distribution.  Of note she has history of right carpal tunnel release by Dr. Lai.  She reports that she did not have symptomatic relief from this.  She reports night numbness and tingling.  She has failed bracing, anti-inflammatory therapy.  She has had multiple injections bilaterally which 1st gave her moderate relief over the last round of injections only gave her 2-3 weeks before symptoms returned.  She now complains of diffuse wrist pain worse on the left.  Denies any recent traumatic events or other injuries.    PMH:   Past Medical History:   Diagnosis Date    Diabetes mellitus     GERD (gastroesophageal reflux disease)     Hypertension        PSH:    has no past surgical history on file.    SOCIAL:    reports that she has been smoking cigarettes. She has been smoking an average of .25 packs per day. She has never used smokeless tobacco. She reports that she does not drink alcohol and does not use drugs.    FAMILY:  No family history on file.    MEDS:   Current Outpatient Medications on File Prior to Visit   Medication Sig Dispense Refill    amLODIPine (NORVASC) 5 MG tablet Take 5 mg by mouth every morning.      COMBIGAN 0.2-0.5 % Drop Place 1 drop into both eyes 2 (two) times daily.      cyclobenzaprine (FLEXERIL) 10 MG tablet cyclobenzaprine Take No date recorded No form recorded No frequency recorded No route recorded No set duration recorded No set  duration amount recorded active No dosage strength recorded No dosage strength units of measure recorded      DULoxetine (CYMBALTA) 30 MG capsule Take 1 capsule by mouth once daily.      esomeprazole (NEXIUM) 40 MG capsule Take 40 mg by mouth.      gabapentin (NEURONTIN) 300 MG capsule Take 300 mg by mouth 3 (three) times daily.      hyoscyamine (ANASPAZ,LEVSIN) 0.125 mg Tab Take 1 tablet (125 mcg total) by mouth every 6 (six) hours as needed (abdominal cramping). 20 tablet 0    JANUVIA 100 mg Tab Take 100 mg by mouth every morning.      lancets Misc Test once daily      losartan (COZAAR) 100 MG tablet Take 100 mg by mouth.      ondansetron (ZOFRAN) 4 MG tablet Take 1 tablet (4 mg total) by mouth every 8 (eight) hours as needed for Nausea. 20 tablet 0    oxyCODONE-acetaminophen (PERCOCET) 5-325 mg per tablet Take 1 tablet by mouth 2 (two) times daily.      ROCKLATAN 0.02-0.005 % Drop Place 1 drop into both eyes every evening.      rosuvastatin (CRESTOR) 10 MG tablet Take 10 mg by mouth every evening.       No current facility-administered medications on file prior to visit.       ALLERGY:   Allergies as of 06/19/2023 - Reviewed 06/19/2023   Allergen Reaction Noted    Nsaids (non-steroidal anti-inflammatory drug) Anaphylaxis 09/29/2015    Sulfa (sulfonamide antibiotics) Hives 09/29/2015    Aspirin Other (See Comments) 07/19/2016       Vitals:    There were no vitals filed for this visit.    Labs:    Physical Exam:    Gen: A/Ox3, NAD  Card: RR by RP  Lungs: nonlabored breathing, symmetric chest rise  Abd: S/NT/ND    Bilateral upper extremities     Mild paraspinal cervical tenderness   Negative Spurling  Negative River's   5 of 5 dealt, triceps, biceps, wrist extensors, flexors, intrinsics  Positive Tinel's at the wrist bilaterally   Positive Phalen's bilaterally   Negative Tinel's at the elbow  Negative elbow flexion test   Positive Finkelstein's pain over the 1st compartment on the left  Tenderness over A1 pulley  left ring finger, no obvious triggering  Diffuse dorsal wrist pain upon palpation   4-5 thenar strength, 5/5 hypothenar strength   Two-point discrimination 5 mm all digits   Well-perfused distally    Radiology:  Bilateral wrist x-ray demonstrates no acute fracture or dislocation.  There appears to be chronic SL widening and SLAC wrist.    Assessment/Plan:  62 y.o. female history of bilateral wrist and hand pain status post carpal tunnel release on the right now with bilateral SLAC wrist, carpal tunnel syndrome, left de Quervain's tenosynovitis, left ring finger trigger finger, right ring finger trigger finger.      Discussed findings with the patient.  She has multiple tender points about the wrist and hand.  EMG does indicate carpal tunnel syndrome over this does not appear to be her most symptomatic problem.  Discussed injecting her 1st dorsal compartment and trigger finger as diagnostic and therapeutic treatment.  If she gets symptom relief from these injections over symptoms return we can discuss carpal tunnel release, 1st dorsal compartment release and trigger finger release    Injection performed by sports fellow today    Continue carpal tunnel bracing at night     Patient is following up with neurologist for repeat testing; she will follow up with our clinic after this is completed    Triston Rodgers MD

## 2023-06-19 NOTE — PROGRESS NOTES
Faculty Attestation: Abbi Amador  was seen at [facility] in the Orthopaedic Clinic. Patient seen and evaluated at the time of the visit. History of Present Illness, Physical Exam, and Assessment and Plan reviewed. Treatment plan is reasonable and appropriate. Compliance with treatment recommendations is important. Procedure note reviewed. Present for entire procedure with the resident. Patient tolerated procedure well.      Amrit Isaacs MD  Orthopaedic Surgery

## 2023-06-19 NOTE — PROGRESS NOTES
Trigger finger: L fourth MTP    Date/Time: 6/19/2023 7:50 AM  Performed by: Jose Manuel Mack DO  Authorized by: Jose Manuel Mack, DO     Consent Done?:  Yes (Written)  Indications:  Pain and diagnostic evaluation  Site marked: the procedure site was marked    Timeout: prior to procedure the correct patient, procedure, and site was verified    Prep: patient was prepped and draped in usual sterile fashion      Local anesthesia used?: Yes    Local anesthetic:  Topical anesthetic  Location:  Fourth toe  Site:  L fourth MTP  Ultrasonic guidance for needle placement?: No    Needle size:  25 G  Patient tolerance:  Patient tolerated the procedure well with no immediate complications    Additional Comments: Staff: Amrit Isaacs MD    Risks:  Possible complications with the injection include bleeding, infection (.01%), tendon rupture, steroid flare, fat pad or soft tissue atrophy, skin depigmentation, allergic reaction to medications and vasovagal response. (steroid flare treatment is rest, ice, NSAIDs and resolves in 24-36 hours.)    Consent:  Procedure, risks, benefits, and alternatives explained the patient, who voiced understanding and agreed to proceed with procedure.  Consent signed and scanned into the medical record.   No absolute contraindications (cellulitis overlying joint, infection, lack of informed consent, allergy to injection medication, AVN protein or egg allergy for sodium hyaluronate, or history of steroid flare) or relative contraindications (uncontrolled DM2 A1c>10, coagulopathy, INR > 3.5, previous joint replacement or history of AVN).          Description:  Time-out performed.  The patient was prepped in normal sterile fashion use of chlorhexidine scrub and the appropriate and anatomic landmarks were identified with ultrasound.  Sterile 25 gauge 1 inch  was used. Topical ethyl chloride was applied. Contents of syringe included: 1cc of 1% lidocaine with 40mg of Kenalog     Post Procedure: Patient alert, and  moving all extremities. ROM improved, pain decreased.  Good peripheral pulses, no signs of vascular compromise and range of motion intact.  Aftercare instructions were given to patient at time of discharge.  Relative rest for 3 days-avoiding excess activity.  Place ice on the area for 15 minutes every 4-6 hours. Patient may take Tylenol a 1000 mg b.i.d. or ibuprofen 600 mg t.i.d. for the next 3-4 days if not on medication already and safe to take pending co-morbidities.  Protect the area for the next 1-8 hours if anesthetic was used.  Avoid excessive activity for the next 3-4 weeks.  ER precautions given for fever, severe joint pain or allergic reaction or other new symptoms related to the joint injection.       De Quervain's    Date/Time: 6/19/2023 7:50 AM  Performed by: Jose Manuel Mack DO  Authorized by: Jose Manuel Mack, DO     Consent Done?:  Yes (Written)  Indications:  Pain and diagnostic evaluation  Site marked: the procedure site was marked    Timeout: prior to procedure the correct patient, procedure, and site was verified    Prep: patient was prepped and draped in usual sterile fashion      Local anesthesia used?: Yes    Local anesthetic:  Topical anesthetic  Location:  Thumb  Site:  R thumb extension tendon sheath  Ultrasonic guidance for needle placement?: No    Needle size:  25 G  Patient tolerance:  Patient tolerated the procedure well with no immediate complications    Additional Comments: Staff: Amrit Isaacs MD    Risks:  Possible complications with the injection include bleeding, infection (.01%), tendon rupture, steroid flare, fat pad or soft tissue atrophy, skin depigmentation, allergic reaction to medications and vasovagal response. (steroid flare treatment is rest, ice, NSAIDs and resolves in 24-36 hours.)    Consent:  Procedure, risks, benefits, and alternatives explained the patient, who voiced understanding and agreed to proceed with procedure.  Consent signed and scanned into the medical record.    No absolute contraindications (cellulitis overlying joint, infection, lack of informed consent, allergy to injection medication, AVN protein or egg allergy for sodium hyaluronate, or history of steroid flare) or relative contraindications (uncontrolled DM2 A1c>10, coagulopathy, INR > 3.5, previous joint replacement or history of AVN).          Description:  Time-out performed.  The patient was prepped in normal sterile fashion use of chlorhexidine scrub and the appropriate and anatomic landmarks were identified with ultrasound.  Sterile 25 gauge 1 inch  was used. Topical ethyl chloride was applied. Contents of syringe included: 1cc of 1% lidocaine with 40mg of Kenalog     Post Procedure: Patient alert, and moving all extremities. ROM improved, pain decreased.  Good peripheral pulses, no signs of vascular compromise and range of motion intact.  Aftercare instructions were given to patient at time of discharge.  Relative rest for 3 days-avoiding excess activity.  Place ice on the area for 15 minutes every 4-6 hours. Patient may take Tylenol a 1000 mg b.i.d. or ibuprofen 600 mg t.i.d. for the next 3-4 days if not on medication already and safe to take pending co-morbidities.  Protect the area for the next 1-8 hours if anesthetic was used.  Avoid excessive activity for the next 3-4 weeks.  ER precautions given for fever, severe joint pain or allergic reaction or other new symptoms related to the joint injection.

## 2023-09-06 DIAGNOSIS — G56.03 BILATERAL CARPAL TUNNEL SYNDROME: Primary | ICD-10-CM

## 2023-12-06 ENCOUNTER — OFFICE VISIT (OUTPATIENT)
Dept: ORTHOPEDICS | Facility: CLINIC | Age: 63
End: 2023-12-06
Payer: MEDICAID

## 2023-12-06 VITALS
WEIGHT: 202.81 LBS | HEART RATE: 72 BPM | TEMPERATURE: 98 F | HEIGHT: 64 IN | DIASTOLIC BLOOD PRESSURE: 83 MMHG | OXYGEN SATURATION: 98 % | RESPIRATION RATE: 20 BRPM | SYSTOLIC BLOOD PRESSURE: 154 MMHG | BODY MASS INDEX: 34.62 KG/M2

## 2023-12-06 DIAGNOSIS — G56.03 BILATERAL CARPAL TUNNEL SYNDROME: Primary | ICD-10-CM

## 2023-12-06 PROCEDURE — 4010F PR ACE/ARB THEARPY RXD/TAKEN: ICD-10-PCS | Mod: CPTII,,, | Performed by: SPECIALIST

## 2023-12-06 PROCEDURE — 3008F PR BODY MASS INDEX (BMI) DOCUMENTED: ICD-10-PCS | Mod: CPTII,,, | Performed by: SPECIALIST

## 2023-12-06 PROCEDURE — 99214 OFFICE O/P EST MOD 30 MIN: CPT | Mod: PBBFAC

## 2023-12-06 PROCEDURE — 1159F PR MEDICATION LIST DOCUMENTED IN MEDICAL RECORD: ICD-10-PCS | Mod: CPTII,,, | Performed by: SPECIALIST

## 2023-12-06 PROCEDURE — 3077F SYST BP >= 140 MM HG: CPT | Mod: CPTII,,, | Performed by: SPECIALIST

## 2023-12-06 PROCEDURE — 3079F PR MOST RECENT DIASTOLIC BLOOD PRESSURE 80-89 MM HG: ICD-10-PCS | Mod: CPTII,,, | Performed by: SPECIALIST

## 2023-12-06 PROCEDURE — 3079F DIAST BP 80-89 MM HG: CPT | Mod: CPTII,,, | Performed by: SPECIALIST

## 2023-12-06 PROCEDURE — 4010F ACE/ARB THERAPY RXD/TAKEN: CPT | Mod: CPTII,,, | Performed by: SPECIALIST

## 2023-12-06 PROCEDURE — 99499 UNLISTED E&M SERVICE: CPT | Mod: S$PBB,,, | Performed by: SPECIALIST

## 2023-12-06 PROCEDURE — 3044F HG A1C LEVEL LT 7.0%: CPT | Mod: CPTII,,, | Performed by: SPECIALIST

## 2023-12-06 PROCEDURE — 3077F PR MOST RECENT SYSTOLIC BLOOD PRESSURE >= 140 MM HG: ICD-10-PCS | Mod: CPTII,,, | Performed by: SPECIALIST

## 2023-12-06 PROCEDURE — 3044F PR MOST RECENT HEMOGLOBIN A1C LEVEL <7.0%: ICD-10-PCS | Mod: CPTII,,, | Performed by: SPECIALIST

## 2023-12-06 PROCEDURE — 3008F BODY MASS INDEX DOCD: CPT | Mod: CPTII,,, | Performed by: SPECIALIST

## 2023-12-06 PROCEDURE — 1159F MED LIST DOCD IN RCRD: CPT | Mod: CPTII,,, | Performed by: SPECIALIST

## 2023-12-06 PROCEDURE — 99499 NO LOS: ICD-10-PCS | Mod: S$PBB,,, | Performed by: SPECIALIST

## 2023-12-06 NOTE — PROGRESS NOTES
[facility] Orthopaedic Surgery Clinic Note      HPI:  62 y.o. female diabetes, hypertension presents to clinic today for bilateral wrist and hand pain.  Patient reports diffuse bilateral wrist and hand pain that has progressively worsened over the past several months.  She was referred to our clinic for concern for SLAC wrist with possible SL widening left greater than right.  She also received an EMG demonstrating moderate bilateral carpal tunnel and mild bilateral ulnar neuropathy.  She does complain of bilateral numbness and tingling mostly in the median nerve distribution.  Of note she has history of right carpal tunnel release by Dr. Lai.  She reports that she did not have symptomatic relief from this.  She reports night numbness and tingling.  She has failed bracing, anti-inflammatory therapy.  She has had multiple injections bilaterally which 1st gave her moderate relief over the last round of injections only gave her 2-3 weeks before symptoms returned.  She now complains of diffuse wrist pain worse on the left.  Denies any recent traumatic events or other injuries.  At last visit she had her left de Quervain injected.  She got good relief from this but it caused her sugars to increase and her skin to become discolored.    PMH:   Past Medical History:   Diagnosis Date    Diabetes mellitus     GERD (gastroesophageal reflux disease)     Hypertension        PSH:    has a past surgical history that includes Breast surgery (2017) and Hysterectomy (1996).    SOCIAL:    reports that she has been smoking cigarettes. She started smoking about 26 years ago. She has a 12.0 pack-year smoking history. She has never used smokeless tobacco. She reports that she does not currently use alcohol. She reports that she does not use drugs.    FAMILY:  Family History   Problem Relation Age of Onset    Cancer Mother     Hypertension Mother     Diabetes Maternal Uncle        MEDS:   Current Outpatient Medications on File Prior to Visit    Medication Sig Dispense Refill    amLODIPine (NORVASC) 5 MG tablet Take 5 mg by mouth every morning.      COMBIGAN 0.2-0.5 % Drop Place 1 drop into both eyes 2 (two) times daily.      cyclobenzaprine (FLEXERIL) 10 MG tablet cyclobenzaprine Take No date recorded No form recorded No frequency recorded No route recorded No set duration recorded No set duration amount recorded active No dosage strength recorded No dosage strength units of measure recorded      DULoxetine (CYMBALTA) 30 MG capsule Take 1 capsule by mouth once daily.      esomeprazole (NEXIUM) 40 MG capsule Take 40 mg by mouth.      gabapentin (NEURONTIN) 300 MG capsule Take 300 mg by mouth 3 (three) times daily.      hyoscyamine (ANASPAZ,LEVSIN) 0.125 mg Tab Take 1 tablet (125 mcg total) by mouth every 6 (six) hours as needed (abdominal cramping). 20 tablet 0    JANUVIA 100 mg Tab Take 100 mg by mouth every morning.      lancets Misc Test once daily      losartan (COZAAR) 100 MG tablet Take 100 mg by mouth.      ondansetron (ZOFRAN) 4 MG tablet Take 1 tablet (4 mg total) by mouth every 8 (eight) hours as needed for Nausea. 20 tablet 0    ROCKLATAN 0.02-0.005 % Drop Place 1 drop into both eyes every evening.      rosuvastatin (CRESTOR) 10 MG tablet Take 10 mg by mouth every evening.      oxyCODONE-acetaminophen (PERCOCET) 5-325 mg per tablet Take 1 tablet by mouth 2 (two) times daily.       Current Facility-Administered Medications on File Prior to Visit   Medication Dose Route Frequency Provider Last Rate Last Admin    LIDOcaine (PF) 10 mg/ml (1%) injection 10 mg  1 mL Other 1 time in Clinic/Triston Tipton MD        LIDOcaine (PF) 10 mg/ml (1%) injection 10 mg  1 mL Other 1 time in Clinic/Triston Tipton MD        triamcinolone acetonide injection 40 mg  40 mg Intra-articular 1 time in Clinic/Triston Tipton MD           ALLERGY:   Allergies as of 12/06/2023 - Reviewed 12/06/2023   Allergen Reaction Noted    Nsaids (non-steroidal  anti-inflammatory drug) Anaphylaxis 09/29/2015    Sulfa (sulfonamide antibiotics) Hives 09/29/2015    Aspirin Other (See Comments) 07/19/2016       Vitals:    Vitals:    12/06/23 1114   BP: (!) 154/83   Pulse: 72   Resp: 20   Temp: 98.2 °F (36.8 °C)       Labs:    Physical Exam:    Gen: A/Ox3, NAD  Card: RR by RP  Lungs: nonlabored breathing, symmetric chest rise  Abd: S/NT/ND    Bilateral upper extremities     Mild paraspinal cervical tenderness   Negative Spurling  Negative River's   5 of 5 dealt, triceps, biceps, wrist extensors, flexors, intrinsics  Positive Tinel's at the wrist bilaterally   Positive Phalen's bilaterally   Negative Tinel's at the elbow  Negative elbow flexion test   Positive Finkelstein's pain over the 1st compartment on the left  Tenderness over A1 pulley left ring finger, no obvious triggering  Diffuse dorsal wrist pain upon palpation   4-5 thenar strength, 5/5 hypothenar strength   Two-point discrimination 5 mm all digits   Well-perfused distally    Radiology:  Bilateral wrist x-ray demonstrates no acute fracture or dislocation.  There appears to be chronic SL widening and SLAC wrist.    Assessment/Plan:  62 y.o. female history of bilateral wrist and hand pain status post carpal tunnel release on the right now with bilateral SLAC wrist, carpal tunnel syndrome, left de Quervain's tenosynovitis, left ring finger trigger finger, right ring finger trigger finger.      Patient not interested in surgery at this time.  Wants to wait until after the holidays.  Discussed that she may be a candidate for a left carpal tunnel release, Guyon canal release, cubital tunnel release and de Quervain release.  She will follow up in January for consideration of surgery.  She has many problems to her bilateral hands and we will need to decide which 1 she wants addressed    Gomez Emery MD

## 2024-01-08 ENCOUNTER — OFFICE VISIT (OUTPATIENT)
Dept: ORTHOPEDICS | Facility: CLINIC | Age: 64
End: 2024-01-08
Payer: MEDICAID

## 2024-01-08 ENCOUNTER — HOSPITAL ENCOUNTER (OUTPATIENT)
Dept: RADIOLOGY | Facility: HOSPITAL | Age: 64
Discharge: HOME | End: 2024-01-08
Attending: STUDENT IN AN ORGANIZED HEALTH CARE EDUCATION/TRAINING PROGRAM
Payer: MEDICAID

## 2024-01-08 VITALS
BODY MASS INDEX: 34.12 KG/M2 | WEIGHT: 204.81 LBS | DIASTOLIC BLOOD PRESSURE: 84 MMHG | TEMPERATURE: 98 F | HEIGHT: 65 IN | HEART RATE: 80 BPM | SYSTOLIC BLOOD PRESSURE: 142 MMHG

## 2024-01-08 DIAGNOSIS — M19.131 SLAC (SCAPHOLUNATE ADVANCED COLLAPSE) OF WRIST, RIGHT: ICD-10-CM

## 2024-01-08 DIAGNOSIS — M54.2 CERVICALGIA: ICD-10-CM

## 2024-01-08 DIAGNOSIS — G56.03 BILATERAL CARPAL TUNNEL SYNDROME: Primary | ICD-10-CM

## 2024-01-08 DIAGNOSIS — M19.132 SLAC (SCAPHOLUNATE ADVANCED COLLAPSE) OF WRIST, LEFT: ICD-10-CM

## 2024-01-08 PROCEDURE — 3008F BODY MASS INDEX DOCD: CPT | Mod: CPTII,,, | Performed by: SPECIALIST

## 2024-01-08 PROCEDURE — 73110 X-RAY EXAM OF WRIST: CPT | Mod: TC,LT

## 2024-01-08 PROCEDURE — 73110 X-RAY EXAM OF WRIST: CPT | Mod: TC,RT

## 2024-01-08 PROCEDURE — 99214 OFFICE O/P EST MOD 30 MIN: CPT | Mod: PBBFAC,25

## 2024-01-08 PROCEDURE — 99213 OFFICE O/P EST LOW 20 MIN: CPT | Mod: S$PBB,,, | Performed by: SPECIALIST

## 2024-01-08 PROCEDURE — 3079F DIAST BP 80-89 MM HG: CPT | Mod: CPTII,,, | Performed by: SPECIALIST

## 2024-01-08 PROCEDURE — 1159F MED LIST DOCD IN RCRD: CPT | Mod: CPTII,,, | Performed by: SPECIALIST

## 2024-01-08 PROCEDURE — 3077F SYST BP >= 140 MM HG: CPT | Mod: CPTII,,, | Performed by: SPECIALIST

## 2024-01-08 PROCEDURE — 96372 THER/PROPH/DIAG INJ SC/IM: CPT | Mod: PBBFAC

## 2024-01-08 RX ORDER — TRIAMCINOLONE ACETONIDE 40 MG/ML
40 INJECTION, SUSPENSION INTRA-ARTICULAR; INTRAMUSCULAR
Status: COMPLETED | OUTPATIENT
Start: 2024-01-08 | End: 2024-01-08

## 2024-01-08 RX ORDER — LIDOCAINE HYDROCHLORIDE 10 MG/ML
1 INJECTION, SOLUTION EPIDURAL; INFILTRATION; INTRACAUDAL; PERINEURAL
Status: COMPLETED | OUTPATIENT
Start: 2024-01-08 | End: 2024-01-08

## 2024-01-08 RX ADMIN — TRIAMCINOLONE ACETONIDE 40 MG: 40 INJECTION, SUSPENSION INTRA-ARTICULAR; INTRAMUSCULAR at 03:01

## 2024-01-08 RX ADMIN — LIDOCAINE HYDROCHLORIDE 10 MG: 10 INJECTION, SOLUTION EPIDURAL; INFILTRATION; INTRACAUDAL; PERINEURAL at 03:01

## 2024-01-08 NOTE — PROGRESS NOTES
[facility]  Established Patient Office Visit  01/08/2024       Patient ID: Abbi Amador  YOB: 1960  MRN: [id number]    Chief Complaint: Pain of the Right Wrist (62 yo - RHD - Bilateral wrist pain. Pain started in 2018. Pt has had injections previously. C/o numbness and tingling. Thumb, middle, and ring fingers hurt the most. ) and Pain of the Left Wrist      Past Orthopaedic Surgeries:  Right carpal tunnel release (Joelle, several years ago)    HPI:  Abbi Amador is a 63 y.o. female with numerous medical comorbidities presenting for follow-up with chief complaint of bilateral hand wrist pain, numbness, tingling.  In terms of her right hand, she has dexterity issues.  She is pain at the wrist on the radial aspect.  She has a history of a carpal tunnel release on this side.  She had no relief from this carpal tunnel release.  She has nighttime numbness and tingling particularly in her middle finger but extending to the radial digits of the hand.  She occasionally also has some numbness and tingling in her ring finger.  She does not note any numbness or tingling in her small finger.  She is tried bracing which has not provided relief.  She says that the pain sometimes shoots up to her elbow and sometimes to her shoulder.  She does report pain in her neck.  Additionally, she reports primarily radial sided wrist pain in her left wrist.  On this side, she does not have as much disc sterilely issues.  He has had several injections bilaterally with issues with hyperglycemia after these injections.  Additionally, she has had pigmentation changes after the injections.  She gives about a month of relief from her injections.  EMG showed moderate left carpal tunnel, mild left ulnar at the wrist and elbow, moderate right carpal tunnel with mild right ulnar at the wrist and the elbow.  She tried physical therapy after carpal tunnel relief with minimal relief of her  symptoms.    Past Medical History:    Past Medical History:   Diagnosis Date    Diabetes mellitus     GERD (gastroesophageal reflux disease)     Hypertension      Past Surgical History:   Procedure Laterality Date    BREAST SURGERY  2017    HYSTERECTOMY  1996     Family History   Problem Relation Age of Onset    Cancer Mother     Hypertension Mother     Diabetes Maternal Uncle      Social History     Socioeconomic History    Marital status: Single   Tobacco Use    Smoking status: Some Days     Current packs/day: 0.25     Average packs/day: 0.3 packs/day for 37.2 years (12.0 ttl pk-yrs)     Types: Cigarettes     Start date: 11/5/1997    Smokeless tobacco: Never   Substance and Sexual Activity    Alcohol use: Not Currently    Drug use: Never    Sexual activity: Not Currently     Partners: Male     Birth control/protection: Abstinence     Medication List with Changes/Refills   Current Medications    AMLODIPINE (NORVASC) 5 MG TABLET    Take 5 mg by mouth every morning.    COMBIGAN 0.2-0.5 % DROP    Place 1 drop into both eyes 2 (two) times daily.    CYCLOBENZAPRINE (FLEXERIL) 10 MG TABLET    cyclobenzaprine Take No date recorded No form recorded No frequency recorded No route recorded No set duration recorded No set duration amount recorded active No dosage strength recorded No dosage strength units of measure recorded    DULOXETINE (CYMBALTA) 30 MG CAPSULE    Take 1 capsule by mouth once daily.    ESOMEPRAZOLE (NEXIUM) 40 MG CAPSULE    Take 40 mg by mouth.    GABAPENTIN (NEURONTIN) 300 MG CAPSULE    Take 300 mg by mouth 3 (three) times daily.    HYOSCYAMINE (ANASPAZ,LEVSIN) 0.125 MG TAB    Take 1 tablet (125 mcg total) by mouth every 6 (six) hours as needed (abdominal cramping).    JANUVIA 100 MG TAB    Take 100 mg by mouth every morning.    LANCETS MISC    Test once daily    LOSARTAN (COZAAR) 100 MG TABLET    Take 100 mg by mouth.    ONDANSETRON (ZOFRAN) 4 MG TABLET    Take 1 tablet (4 mg total) by mouth every 8  (eight) hours as needed for Nausea.    OXYCODONE-ACETAMINOPHEN (PERCOCET) 5-325 MG PER TABLET    Take 1 tablet by mouth 2 (two) times daily.    ROCKLATAN 0.02-0.005 % DROP    Place 1 drop into both eyes every evening.    ROSUVASTATIN (CRESTOR) 10 MG TABLET    Take 10 mg by mouth every evening.     Review of patient's allergies indicates:   Allergen Reactions    Nsaids (non-steroidal anti-inflammatory drug) Anaphylaxis    Sulfa (sulfonamide antibiotics) Hives    Aspirin Other (See Comments)     Kidney bleeding       Physical Exam:    Mild paraspinal cervical tenderness  Negative Spurling's    Right upper extremity  Depigmentation at site of prior injection  No gross deformity  Subtle atrophy in the thenar crease  Positive froment  Positive Wartenberg  Tender in the A1 pulleys of all fingers and the thumb, tender in the anatomic snuffbox and over the proximal scaphoid at the radiocarpal joint  Difficulty making a full composite fist but no obvious triggering  Positive Phalen's  Positive Tinel's at the carpal tunnel and cubital tunnel  Negative elbow flexion compression test  Negative Finkelstein's  Altered sensation to all fingertips particularly in the median distribution  Intrinsic weakness, AIN/PIN intact  Radial pulse palpable    Left upper extremity  Depigmentation site of prior injection   No gross deformity   No atrophy   Negative Froment, Wartenberg   Tender to palpation at the anatomic snuffbox of the proximal scaphoid of the radiocarpal joint   No tenderness to palpation over the A1 pulleys   Can make a full composite fist without issue   Positive Phalen's  Positive Tinel's of the carpal and cubital tunnel   Negative elbow flexion compression test   Negative Finkelstein's   Sensation intact to light touch mru but altered  And 1st PIN/ulnar intact new line radial pulse palpable      Imaging independently interpreted:  X-rays of bilateral wrists show scapholunate interval widening unchanged.    Assessment and  Plan:    Abbi Amador is a 63 y.o. 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  We will perform a corticosteroid injection into her radiocarpal joint today.  Informed consent obtained.  See procedure note below.  If this provides her with symptom improvement, we can ordered MRI and discuss potential operative management in the future.  If this does not provide her with symptom relief, then it could be nerve compression causing her symptoms and we can address this with possible revision carpal tunnel, cubital tunnel release, possible Guyon's canal release.  This will be for the right side.  We would have to address her left-sided a later date.  We discussed with her that she should monitor her blood glucose as she has had prior hyperglycemia.  She should keep a journal to see if her pain improves.  -we will also order MRI of her cervical spine as she does have some cervical pain along with bilateral upper extremity altered sensation and radiating electric pain    Procedure Note     Date: 01/08/2024  Time:  1:45 PM CST     Procedure: right radiocarpal injection     Indications: Therapeutic Indication - Decrease pain, Increase range of motion and improve quality of life:   Risks:  Possible complications with the injection include bleeding, infection (.01%), tendon rupture, steroid flare, fat pad or soft tissue atrophy, skin depigmentation, allergic reaction to medications and vasovagal response. (steroid flare treatment is rest, ice, NSAIDs and resolves in 24-36 hours.)  Consent:  Procedure, risks, benefits, and alternatives explained the patient, who voiced understanding and agreed to proceed with  procedure.  Consent signed and scanned into the medical record.   No absolute contraindications (cellulitis overlying joint, infection, lack of informed consent, allergy to injection medication, AVN protein or egg allergy for sodium hyaluronate, or history of steroid flare) or relative contraindications (uncontrolled DM2 A1c>10, coagulopathy, INR > 3.5, previous joint replacement or history of AVN).        Description:  Time-out performed.  The patient was prepped in normal sterile fashion use of chlorhexidine scrub and the appropriate and anatomic landmarks were identified. Topical ethyl chloride was applied. Contents of syringe included: 1cc of 1% lidocaine with 40mg of Kenalog   Complications: None   EBL: None   Post Procedure: Patient alert, and moving all extremities. ROM improved, pain decreased.  Good peripheral pulses, no signs of vascular compromise and range of motion intact.  Aftercare instructions were given to patient at time of discharge.  Relative rest for 3 days-avoiding excess activity.  Place ice on the area for 15 minutes every 4-6 hours. Patient may take Tylenol a 1000 mg b.i.d. or ibuprofen 600 mg t.i.d. for the next 3-4 days if not on medication already and safe to take pending co-morbidities.  Protect the area for the next 1-8 hours if anesthetic was used.  Avoid excessive activity for the next 3-4 weeks.  ER precautions given for fever, severe joint pain or allergic reaction or other new symptoms related to the joint injection.        Bruno Ellis MD  [facility] Orthopaedic Surgery PGY-3          Orders Placed This Encounter    X-Ray Wrist Complete Left    X-Ray Wrist Complete Right    LIDOcaine (PF) 10 mg/ml (1%) injection 10 mg    triamcinolone acetonide injection 40 mg

## 2024-01-10 NOTE — PROGRESS NOTES
Faculty Attestation: Abbi Amador  was seen at [facility] in the Orthopaedic Clinic. Patient seen and evaluated at the time of the visit. History of Present Illness, Physical Exam, and Assessment and Plan reviewed. Treatment plan is reasonable and appropriate. Compliance with treatment recommendations is important. No procedure was performed.     Amrit Isaacs MD  Orthopaedic Surgery

## 2024-02-16 ENCOUNTER — APPOINTMENT (OUTPATIENT)
Dept: RADIOLOGY | Facility: HOSPITAL | Age: 64
End: 2024-02-16
Attending: STUDENT IN AN ORGANIZED HEALTH CARE EDUCATION/TRAINING PROGRAM
Payer: MEDICAID

## 2024-02-16 DIAGNOSIS — M54.2 CERVICALGIA: ICD-10-CM

## 2024-02-16 PROCEDURE — 72141 MRI NECK SPINE W/O DYE: CPT | Mod: TC

## 2024-03-27 ENCOUNTER — OFFICE VISIT (OUTPATIENT)
Dept: ORTHOPEDICS | Facility: CLINIC | Age: 64
End: 2024-03-27
Payer: MEDICAID

## 2024-03-27 VITALS
TEMPERATURE: 98 F | RESPIRATION RATE: 20 BRPM | WEIGHT: 203.81 LBS | HEART RATE: 88 BPM | HEIGHT: 64 IN | OXYGEN SATURATION: 97 % | DIASTOLIC BLOOD PRESSURE: 84 MMHG | SYSTOLIC BLOOD PRESSURE: 151 MMHG | BODY MASS INDEX: 34.8 KG/M2

## 2024-03-27 DIAGNOSIS — M48.02 CERVICAL STENOSIS OF SPINE: Primary | ICD-10-CM

## 2024-03-27 PROCEDURE — 4010F ACE/ARB THERAPY RXD/TAKEN: CPT | Mod: CPTII,,, | Performed by: REHABILITATION UNIT

## 2024-03-27 PROCEDURE — 3079F DIAST BP 80-89 MM HG: CPT | Mod: CPTII,,, | Performed by: REHABILITATION UNIT

## 2024-03-27 PROCEDURE — 1159F MED LIST DOCD IN RCRD: CPT | Mod: CPTII,,, | Performed by: REHABILITATION UNIT

## 2024-03-27 PROCEDURE — 99215 OFFICE O/P EST HI 40 MIN: CPT | Mod: PBBFAC

## 2024-03-27 PROCEDURE — 3008F BODY MASS INDEX DOCD: CPT | Mod: CPTII,,, | Performed by: REHABILITATION UNIT

## 2024-03-27 PROCEDURE — 99214 OFFICE O/P EST MOD 30 MIN: CPT | Mod: S$PBB,,, | Performed by: REHABILITATION UNIT

## 2024-03-27 PROCEDURE — 3077F SYST BP >= 140 MM HG: CPT | Mod: CPTII,,, | Performed by: REHABILITATION UNIT

## 2024-03-27 PROCEDURE — 3052F HG A1C>EQUAL 8.0%<EQUAL 9.0%: CPT | Mod: CPTII,,, | Performed by: REHABILITATION UNIT

## 2024-03-27 RX ORDER — LIDOCAINE 50 MG/G
1 PATCH TOPICAL DAILY
Qty: 30 PATCH | Refills: 3 | Status: SHIPPED | OUTPATIENT
Start: 2024-03-27

## 2024-03-27 RX ORDER — DICLOFENAC SODIUM 10 MG/G
2 GEL TOPICAL 4 TIMES DAILY
Qty: 200 G | Refills: 3 | Status: SHIPPED | OUTPATIENT
Start: 2024-03-27

## 2024-03-27 NOTE — PROGRESS NOTES
[facility]  Established Patient Office Visit  03/27/2024       Patient ID: Abbi Amador  YOB: 1960  MRN: [id number]    Chief Complaint: Follow-up, Pain, and Results of the Right Wrist (Follow-up for right wrist pain 5/10 takes gabapentin and Tylenol for pain, here for MRI spine )      Past Orthopaedic Surgeries:  Right carpal tunnel release (Joelle, several years ago)    HPI:  Abbi Amador is a 63 y.o. female with numerous medical comorbidities presenting for follow-up with chief complaint of bilateral hand wrist pain, numbness, tingling.  In terms of her right hand, she has dexterity issues.  She is pain at the wrist on the radial aspect.  She has a history of a carpal tunnel release on this side.  She had no relief from this carpal tunnel release.  She has nighttime numbness and tingling particularly in her middle finger but extending to the radial digits of the hand.  She occasionally also has some numbness and tingling in her ring finger.  She does not note any numbness or tingling in her small finger.  She is tried bracing which has not provided relief.  She says that the pain sometimes shoots up to her elbow and sometimes to her shoulder.  She does report pain in her neck.  Additionally, she reports primarily radial sided wrist pain in her left wrist.  On this side, she does not have as much disc sterilely issues.  He has had several injections bilaterally with issues with hyperglycemia after these injections.  Additionally, she has had pigmentation changes after the injections.  She gives about a month of relief from her injections.  EMG showed moderate left carpal tunnel, mild left ulnar at the wrist and elbow, moderate right carpal tunnel with mild right ulnar at the wrist and the elbow.  She tried physical therapy after carpal tunnel relief with minimal relief of her symptoms.    Interval:  She returns to discuss her right wrist pain.  Her  complaints include pain at the base of the thumb on the right, dexterity issues, numbness and tingling particularly in the median nerve distribution that the radial digits of the hand, and pain in her neck which then radiates to not in her shoulders and down her arm.  She said that she never got any relief with her original carpal tunnel release with Dr. Lai many years ago.  She was seeing a spine surgeon for her lumbar spine in a worker's compensation claim.  She was never had her cervical spine evaluated.  She recently got an MRI of the cervical spine that we ordered in his here to review the results of that.  She did get several weeks of relief with her radiocarpal injection, however, she had to be hospitalized because her blood glucose elevate significantly in her PCP recommended hospitalization due to the corticosteroid from a radiocarpal injection.    Past Medical History:    Past Medical History:   Diagnosis Date    Diabetes mellitus     GERD (gastroesophageal reflux disease)     Hypertension      Past Surgical History:   Procedure Laterality Date    BREAST SURGERY  2017    HYSTERECTOMY  1996     Family History   Problem Relation Age of Onset    Cancer Mother     Hypertension Mother     Diabetes Maternal Uncle      Social History     Socioeconomic History    Marital status: Single   Tobacco Use    Smoking status: Some Days     Current packs/day: 0.25     Average packs/day: 0.3 packs/day for 37.4 years (12.1 ttl pk-yrs)     Types: Cigarettes     Start date: 11/5/1997    Smokeless tobacco: Never   Substance and Sexual Activity    Alcohol use: Not Currently    Drug use: Never    Sexual activity: Not Currently     Partners: Male     Birth control/protection: Abstinence     Medication List with Changes/Refills   New Medications    DICLOFENAC SODIUM (VOLTAREN) 1 % GEL    Apply 2 g topically 4 (four) times daily.    LIDOCAINE (LIDODERM) 5 %    Place 1 patch onto the skin once daily. Remove & Discard patch  within 12 hours or as directed by MD   Current Medications    AMLODIPINE (NORVASC) 5 MG TABLET    Take 5 mg by mouth every morning.    COMBIGAN 0.2-0.5 % DROP    Place 1 drop into both eyes 2 (two) times daily.    CYCLOBENZAPRINE (FLEXERIL) 10 MG TABLET    cyclobenzaprine Take No date recorded No form recorded No frequency recorded No route recorded No set duration recorded No set duration amount recorded active No dosage strength recorded No dosage strength units of measure recorded    DULOXETINE (CYMBALTA) 30 MG CAPSULE    Take 1 capsule by mouth once daily.    ESOMEPRAZOLE (NEXIUM) 40 MG CAPSULE    Take 40 mg by mouth.    GABAPENTIN (NEURONTIN) 300 MG CAPSULE    Take 300 mg by mouth 3 (three) times daily.    HYOSCYAMINE (ANASPAZ,LEVSIN) 0.125 MG TAB    Take 1 tablet (125 mcg total) by mouth every 6 (six) hours as needed (abdominal cramping).    JANUVIA 100 MG TAB    Take 100 mg by mouth every morning.    LANCETS MISC    Test once daily    LOSARTAN (COZAAR) 100 MG TABLET    Take 100 mg by mouth.    ONDANSETRON (ZOFRAN) 4 MG TABLET    Take 1 tablet (4 mg total) by mouth every 8 (eight) hours as needed for Nausea.    OXYCODONE-ACETAMINOPHEN (PERCOCET) 5-325 MG PER TABLET    Take 1 tablet by mouth 2 (two) times daily.    ROCKLATAN 0.02-0.005 % DROP    Place 1 drop into both eyes every evening.    ROSUVASTATIN (CRESTOR) 10 MG TABLET    Take 10 mg by mouth every evening.     Review of patient's allergies indicates:   Allergen Reactions    Nsaids (non-steroidal anti-inflammatory drug) Anaphylaxis    Sulfa (sulfonamide antibiotics) Hives    Aspirin Other (See Comments)     Kidney bleeding       Physical Exam:    Mild paraspinal cervical tenderness  Negative Spurling's  Negative Hoffmans    Right upper extremity  Depigmentation at site of prior injection carpal tunnel  No gross deformity  Subtle atrophy in the thenar crease  Positive froment  Positive Wartenberg  Tender in the A1 pulleys of all fingers and the thumb,  tender in the anatomic snuffbox and over the proximal scaphoid at the radiocarpal joint  Difficulty making a full composite fist but no obvious triggering  Positive Phalen's  Positive Tinel's at the carpal tunnel and cubital tunnel  Negative elbow flexion compression test  Negative Finkelstein's  Altered sensation to all fingertips particularly in the median distribution  Intrinsic weakness, AIN/PIN intact  Radial pulse palpable    Left upper extremity  No gross deformity   No atrophy   Negative [place]in, Wartenberg   Tender to palpation at the anatomic snuffbox of the proximal scaphoid of the radiocarpal joint   No tenderness to palpation over the A1 pulleys   Can make a full composite fist without issue   Positive Phalen's  Positive Tinel's of the carpal and cubital tunnel   Negative elbow flexion compression test   Negative Finkelstein's   Sensation intact to light touch mru but altered  And 1st PIN/ulnar intact   radial pulse palpable      Imaging independently interpreted:  MRI of the cervical spine shows multilevel cervical stenosis particularly at C3-4 4 5 and 5 6.  She also some facet arthropathy on the right at C4-5 and 5 6 causing foraminal stenosis in particular which would explain her symptoms.    Assessment and Plan:    Abbi Amador is a 63 y.o. female with cervical radiculopathy, status post right carpal tunnel release with no symptom improvement    -she never had relief with her carpal tunnel release, with a constellation of symptoms in her MRI findings I think she needs to have her cervical spine worked up before we consider a revision carpal tunnel surgery or other procedure.  Her SLAC wrist is not progressed enough to warrant surgery at this stage.  Unfortunately, although she got good relief with a radiocarpal injection, she had increased blood glucoses and this is not an option for her at this time  -physical therapy for her cervical spine  -we will refer to [place] for evaluation of  her cervical spine, she will also work with the primary care provider to get a referral to a spine surgeon  -Voltaren gel, lidocaine patches ordered  -activity as tolerated  -follow up with us in 3 months      Bruno Ellis MD  [employer] Orthopaedic Surgery PGY-3          Orders Placed This Encounter    Ambulatory referral/consult to Physical/Occupational Therapy    Ambulatory referral/consult to Orthopedics    LIDOcaine (LIDODERM) 5 %    diclofenac sodium (VOLTAREN) 1 % Gel

## 2024-03-28 NOTE — PROGRESS NOTES
Faculty Attestation: Abbi Amador  was seen at [facility] in the Orthopaedic Clinic. Discussed with the resident at the time of the visit. History of Present Illness, Physical Exam, and Assessment and Plan reviewed. Treatment plan is reasonable and appropriate. Compliance with treatment recommendations is important. No procedure was performed.     Blaine Ambrose MD  Orthopaedic Surgery

## 2024-05-17 DIAGNOSIS — E04.2 MULTIPLE THYROID NODULES: Primary | ICD-10-CM

## 2024-06-26 ENCOUNTER — OFFICE VISIT (OUTPATIENT)
Dept: ORTHOPEDICS | Facility: CLINIC | Age: 64
End: 2024-06-26
Payer: MEDICAID

## 2024-06-26 VITALS — WEIGHT: 203 LBS | BODY MASS INDEX: 34.66 KG/M2 | HEIGHT: 64 IN

## 2024-06-26 DIAGNOSIS — M54.2 CERVICALGIA: ICD-10-CM

## 2024-06-26 DIAGNOSIS — M19.132 SLAC (SCAPHOLUNATE ADVANCED COLLAPSE) OF WRIST, LEFT: ICD-10-CM

## 2024-06-26 DIAGNOSIS — M48.02 CERVICAL STENOSIS OF SPINE: Primary | ICD-10-CM

## 2024-06-26 PROCEDURE — 99213 OFFICE O/P EST LOW 20 MIN: CPT | Mod: PBBFAC

## 2024-06-26 NOTE — PROGRESS NOTES
[facility]  Established Patient Office Visit  06/26/2024       Patient ID: Abbi Amador  YOB: 1960  MRN: [id number]    Chief Complaint: Follow-up of the Right Hand (Patient is here today for follow up for  right hand pain.  Has been taking meds; pain: NSAID Which has as needed for pain, which does help.  )      Orthopaedic Injuries:  1. Bilateral shoulder pain   2. Cervical stenosis/radiculopathy   3. History of right carpal tunnel release  4. Left SLAC wrist      Orthopaedic Surgeries:     HPI:  Abbi Amador is a 63 y.o. female with numerous problems above.  She is here today for follow up.  In the interval we have referred her for spine.  She has not been in to see them yet.  She is also worker's comp for her left shoulder, , she reports she was supposed to see a shoulder specialist soon. Medical history is significant for poorly controlled diabetes most recent A1c was 9.5.  She was previously physical therapy for her neck and bilateral shoulders, reports this was helping.    12 point ROS performed and negative except as above.     Past Medical History:    Past Medical History:   Diagnosis Date    Diabetes mellitus     GERD (gastroesophageal reflux disease)     Hypertension      Past Surgical History:   Procedure Laterality Date    BREAST SURGERY  2017    HYSTERECTOMY  1996     Family History   Problem Relation Name Age of Onset    Cancer Mother Ramila Moon     Hypertension Mother Ramila Moon     Diabetes Maternal Uncle Matteo Moon      Social History     Socioeconomic History    Marital status: Single   Tobacco Use    Smoking status: Some Days     Current packs/day: 0.25     Average packs/day: 0.3 packs/day for 37.6 years (12.2 ttl pk-yrs)     Types: Cigarettes     Start date: 11/5/1997    Smokeless tobacco: Never   Substance and Sexual Activity    Alcohol use: Not Currently    Drug use: Never    Sexual activity: Not Currently     Partners: Male      Birth control/protection: Abstinence     Social Determinants of Health     Financial Resource Strain: High Risk (3/7/2024)    [facility], [facility] and [facility]    Overall Financial Resource Strain (CARDIA)     Difficulty of Paying Living Expenses: Hard   Food Insecurity: Patient Declined (3/7/2024)    [facility], [facility]    Hunger Vital Sign     Worried About Running Out of Food in the Last Year: Patient declined     Ran Out of Food in the Last Year: Patient declined   Transportation Needs: No Transportation Needs (3/7/2024)    [facility], [facility]    PRAPARE - Transportation     Lack of Transportation (Medical): No     Lack of Transportation (Non-Medical): No   Physical Activity: Inactive (3/7/2024)    [facility], [facility]    Exercise Vital Sign     Days of Exercise per Week: 0 days     Minutes of Exercise per Session: 0 min   Stress: Stress Concern Present (3/7/2024)    [facility], [facility]    [employer] - Occupational Stress Questionnaire     Feeling of Stress : To some extent   Housing Stability: Unknown (3/7/2024)    Received from [facility] and Aff[facility], [facility] and Affiliates    Housing Stability Vital Sign     Unable to Pay for Housing in the Last Year: Patient declined     In the last 12 months, was there a time when you did not have a steady place to sleep or slept in a shelter (including now)?: No     Medication List with Changes/Refills   Current Medications    AMLODIPINE (NORVASC) 5 MG TABLET    Take 5 mg by mouth every morning.    COMBIGAN 0.2-0.5 % DROP    Place 1 drop into both eyes 2 (two) times daily.    CYCLOBENZAPRINE (FLEXERIL) 10 MG TABLET    cyclobenzaprine Take No date recorded No form recorded No frequency recorded No route recorded No set duration recorded No set duration amount recorded active No dosage strength recorded No dosage strength units of measure recorded    DICLOFENAC SODIUM (VOLTAREN) 1 % GEL    Apply 2 g topically 4 (four) times daily.    DULOXETINE (CYMBALTA) 30 MG CAPSULE    Take 1 capsule by mouth once daily.    ESOMEPRAZOLE (NEXIUM) 40 MG CAPSULE    Take 40 mg by mouth.    GABAPENTIN (NEURONTIN) 300 MG CAPSULE    Take 300 mg by mouth 3 (three) times daily.    HYOSCYAMINE (ANASPAZ,LEVSIN) 0.125 MG TAB    Take 1 tablet (125 mcg total) by mouth every 6 (six) hours as needed (abdominal cramping).    JANUVIA 100 MG TAB    Take 100 mg by mouth every morning.    LANCETS MISC    Test once daily    LIDOCAINE (LIDODERM) 5 %    Place 1 patch onto the skin once daily. Remove & Discard patch within 12 hours or as directed by MD    LOSARTAN (COZAAR) 100 MG TABLET    Take 100 mg by mouth.    ONDANSETRON (ZOFRAN) 4 MG TABLET    Take 1 tablet (4 mg total) by mouth every 8 (eight) hours as needed for Nausea.    OXYCODONE-ACETAMINOPHEN (PERCOCET) 5-325 MG PER TABLET    Take 1 tablet by mouth 2 (two) times daily.    ROCKLATAN 0.02-0.005 % DROP    Place 1 drop into both eyes every evening.    ROSUVASTATIN (CRESTOR) 10 MG TABLET    Take 10 mg by  mouth every evening.     Review of patient's allergies indicates:   Allergen Reactions    Nsaids (non-steroidal anti-inflammatory drug) Anaphylaxis    Sulfa (sulfonamide antibiotics) Hives    Aspirin Other (See Comments)     Kidney bleeding       Physical Exam:  Left shoulder  No abrasions or open wounds about the shoulder or hand  NonTTP AC joint, TTP bicipital groove  ROM:   Abd 90 active, 110 passive  FF 90 active, 120 passive  ER 45, IR pocket  5/5 deltoid, biceps, triceps, ER, IR  + Crossbody, - ruiz  + Speeds  + Jobes, + Bear hugger  Motor intact AIN/PIN/ulnar  SILT M/U/R  Hand wwp, radial 2+    Right upper extremity  Depigmentation at site of prior injection carpal tunnel  No gross deformity  Subtle atrophy in the thenar crease  Positive froment  Positive Wartenberg  Tender in the A1 pulleys of all fingers and the thumb, tender in the anatomic snuffbox and over the proximal scaphoid at the radiocarpal joint  Difficulty making a full composite fist but no obvious triggering  Positive Phalen's  Positive Tinel's at the carpal tunnel and cubital tunnel  Negative elbow flexion compression test  Negative Finkelstein's  Altered sensation to all fingertips particularly in the median distribution  Intrinsic weakness, AIN/PIN intact  Radial pulse palpable     Left upper extremity  No gross deformity   No atrophy   Negative Froment, Wartenberg   Tender to palpation at the anatomic snuffbox of the proximal scaphoid of the radiocarpal joint   No tenderness to palpation over the A1 pulleys   Can make a full composite fist without issue   Positive Phalen's  Positive Tinel's of the carpal and cubital tunnel   Negative elbow flexion compression test   Negative Finkelstein's   Sensation intact to light touch mru but altered  And 1st PIN/ulnar intact   radial pulse palpable    Imaging independently interpreted:  No new    Assessment and Plan:    Abbi Amador is a 63 y.o. female with multiple problems detailed above.   She has not a candidate for injections/surgery at this time given her A1c.  She does report that physical therapy was helping, we will renew her script for both the neck and bilateral shoulders now.     WB Status:  Weightbearing as tolerated  Follow up:  As needed  XR/to-do next visit:  No new    Kitty Merritt MD  [employer] [facility] PGY3

## 2024-06-29 ENCOUNTER — HOSPITAL ENCOUNTER (EMERGENCY)
Facility: HOSPITAL | Age: 64
Discharge: HOME | End: 2024-06-29
Attending: EMERGENCY MEDICINE
Payer: OTHER MISCELLANEOUS

## 2024-06-29 VITALS
SYSTOLIC BLOOD PRESSURE: 116 MMHG | BODY MASS INDEX: 33.88 KG/M2 | DIASTOLIC BLOOD PRESSURE: 78 MMHG | HEART RATE: 88 BPM | WEIGHT: 198.44 LBS | HEIGHT: 64 IN | OXYGEN SATURATION: 100 % | RESPIRATION RATE: 18 BRPM | TEMPERATURE: 98 F

## 2024-06-29 DIAGNOSIS — M54.41 ACUTE RIGHT-SIDED LOW BACK PAIN WITH RIGHT-SIDED SCIATICA: Primary | ICD-10-CM

## 2024-06-29 PROCEDURE — 63600175 PHARM REV CODE 636 W HCPCS: Performed by: PHYSICIAN ASSISTANT

## 2024-06-29 PROCEDURE — 96372 THER/PROPH/DIAG INJ SC/IM: CPT | Performed by: PHYSICIAN ASSISTANT

## 2024-06-29 PROCEDURE — 99284 EMERGENCY DEPT VISIT MOD MDM: CPT | Mod: 25

## 2024-06-29 RX ORDER — TRAMADOL HYDROCHLORIDE 50 MG/1
50 TABLET ORAL
Status: DISCONTINUED | OUTPATIENT
Start: 2024-06-29 | End: 2024-06-29 | Stop reason: HOSPADM

## 2024-06-29 RX ORDER — METHOCARBAMOL 100 MG/ML
300 INJECTION, SOLUTION INTRAMUSCULAR; INTRAVENOUS
Status: COMPLETED | OUTPATIENT
Start: 2024-06-29 | End: 2024-06-29

## 2024-06-29 RX ORDER — LIDOCAINE 50 MG/G
1 PATCH TOPICAL DAILY PRN
Qty: 15 PATCH | Refills: 0 | Status: SHIPPED | OUTPATIENT
Start: 2024-06-29

## 2024-06-29 RX ADMIN — METHOCARBAMOL 300 MG: 100 INJECTION INTRAMUSCULAR; INTRAVENOUS at 05:06

## 2024-06-29 NOTE — ED PROVIDER NOTES
Encounter Date: 6/29/2024       History     Chief Complaint   Patient presents with    Back Pain     C/o lower back pain radiating to right lower leg worsening over 2 days     Abbi Amador is a 63 y.o. female with a history of HTN, DM, GERD and chronic back pain s/p spinal stimulator who presents to the ED complaining of worsening back pain x 3 days. Reports pain is in right buttock and radiates down right leg. Has been taking flexeril, gabapentin, and tylenol with no relief. Has spinal stimulator on highest setting as well. Denies fall or trauma. Denies numbness, paresthesias, bowel/bladder incontinence, saddle anesthesia. States this is consistent with previous flares of back pain.    Allergy to NSAIDs and cannot have steroids due to poor glycemic control. Reports norco upsets her stomach.     The history is provided by the patient.     Review of patient's allergies indicates:   Allergen Reactions    Nsaids (non-steroidal anti-inflammatory drug) Anaphylaxis    Sulfa (sulfonamide antibiotics) Hives    Aspirin Other (See Comments)     Kidney bleeding     Past Medical History:   Diagnosis Date    Diabetes mellitus     GERD (gastroesophageal reflux disease)     Hypertension      Past Surgical History:   Procedure Laterality Date    BREAST SURGERY  2017    HYSTERECTOMY  1996     Family History   Problem Relation Name Age of Onset    Cancer Mother Ramila Moon     Hypertension Mother Ramila Moon     Diabetes Maternal Uncle Matteo Moon      Social History     Tobacco Use    Smoking status: Some Days     Current packs/day: 0.25     Average packs/day: 0.3 packs/day for 37.6 years (12.2 ttl pk-yrs)     Types: Cigarettes     Start date: 11/5/1997    Smokeless tobacco: Never   Substance Use Topics    Alcohol use: Not Currently    Drug use: Never     Review of Systems   Constitutional:  Negative for chills and fever.   HENT:  Negative for congestion and sore throat.    Eyes:  Negative for redness and itching.    Respiratory:  Negative for cough and shortness of breath.    Cardiovascular:  Negative for chest pain and leg swelling.   Gastrointestinal:  Negative for abdominal pain and nausea.   Genitourinary:  Negative for dysuria and frequency.   Musculoskeletal:  Positive for back pain. Negative for neck pain.   Skin:  Negative for rash and wound.   Neurological:  Negative for dizziness and weakness.   Hematological:  Does not bruise/bleed easily.       Physical Exam     Initial Vitals [06/29/24 1527]   BP Pulse Resp Temp SpO2   113/77 90 20 98.1 °F (36.7 °C) 100 %      MAP       --         Physical Exam    Nursing note and vitals reviewed.  Constitutional: She appears well-developed and well-nourished. No distress.   HENT:   Head: Normocephalic and atraumatic.   Mouth/Throat: No oropharyngeal exudate.   Eyes: EOM are normal. No scleral icterus.   Neck: Neck supple.   Normal range of motion.  Cardiovascular:  Normal rate and regular rhythm.           No murmur heard.  Pulmonary/Chest: Breath sounds normal. No respiratory distress. She has no wheezes.   Abdominal: Abdomen is soft. Bowel sounds are normal. She exhibits no distension. There is no abdominal tenderness.   Musculoskeletal:         General: No tenderness. Normal range of motion.      Cervical back: Normal range of motion and neck supple.     Neurological: She is alert and oriented to person, place, and time. No cranial nerve deficit.   TTP over right gluteal/sciatic region. + right SLR. Strength equal bilaterally. Sensation intact. 2+ pedal pulses.    Skin: Skin is warm and dry. Capillary refill takes less than 2 seconds. No erythema.   Psychiatric: She has a normal mood and affect. Her behavior is normal. Judgment and thought content normal.         ED Course   Procedures  Labs Reviewed - No data to display       Imaging Results    None          Medications   traMADoL tablet 50 mg (50 mg Oral Not Given 6/29/24 1615)   methocarbamoL injection 300 mg (300 mg  Intramuscular Given 6/29/24 1701)     Medical Decision Making  Pt presents with exacerbation of chronic low back pain. No relief with home gabapentin, flexeril, or tylenol. No neuro deficits on exam. Reports allergy to NSAIDs and cannot have steroids due to poor glycemic control. Reports norco upsets her stomach. Given IM robaxin. Tried to give tramadol but patient refused and requested discharge. Will prescribe lidocaine patches prn as we are limited in other options and pt refuses narcotics. Instructed to follow up with PCP. ED return precautions given. She verbalized understanding. All questions answered.     Amount and/or Complexity of Data Reviewed  External Data Reviewed: radiology.     Details: MRI lumbar spine 7/12/21:     Diffuse lumbar spine discogenic degenerative disease and facet arthrosis as detailed above. No lumbar spinal canal or lateral recess stenosis. Mild bilateral foraminal narrowing at L4-L5 and mild left greater than right neural foramen stenosis at L5-S1.     Risk  Prescription drug management.               ED Course as of 06/29/24 1817   Sat Jun 29, 2024   1653 Pt has not received meds at this time. Discussed with RN who will administer now [KD]      ED Course User Index  [KD] Shirin Casiano PA-C                           Clinical Impression:  Final diagnoses:  [M54.41] Acute right-sided low back pain with right-sided sciatica (Primary)          ED Disposition Condition    Discharge Stable          ED Prescriptions       Medication Sig Dispense Start Date End Date Auth. Provider    LIDOcaine (LIDODERM) 5 % Place 1 patch onto the skin daily as needed (pain). Remove & Discard patch within 12 hours or as directed by MD 15 patch 6/29/2024 -- Shirin Casiano PA-C          Follow-up Information       Follow up With Specialties Details Why Contact Info [facility] Emergency Medicine  If symptoms worsen [address] [facility]  [address]  [phone]    Aby Rodriguez MD Family Medicine In 3 days Hospital follow up [address]  [address]  [facility] [address]  [phone]               Shirin Casiano, PA-C  06/29/24 1813

## 2025-07-25 ENCOUNTER — TELEPHONE (OUTPATIENT)
Dept: BEHAVIORAL HEALTH | Facility: CLINIC | Age: 65
End: 2025-07-25
Payer: OTHER MISCELLANEOUS

## 2025-07-29 ENCOUNTER — OFFICE VISIT (OUTPATIENT)
Dept: BEHAVIORAL HEALTH | Facility: CLINIC | Age: 65
End: 2025-07-29
Payer: MEDICAID

## 2025-07-29 VITALS
BODY MASS INDEX: 33.23 KG/M2 | WEIGHT: 194.63 LBS | DIASTOLIC BLOOD PRESSURE: 80 MMHG | SYSTOLIC BLOOD PRESSURE: 117 MMHG | HEIGHT: 64 IN

## 2025-07-29 DIAGNOSIS — F41.1 GAD (GENERALIZED ANXIETY DISORDER): Chronic | ICD-10-CM

## 2025-07-29 DIAGNOSIS — F33.1 MODERATE EPISODE OF RECURRENT MAJOR DEPRESSIVE DISORDER: Primary | Chronic | ICD-10-CM

## 2025-07-29 PROCEDURE — 3079F DIAST BP 80-89 MM HG: CPT | Mod: CPTII,,, | Performed by: NURSE PRACTITIONER

## 2025-07-29 PROCEDURE — 3008F BODY MASS INDEX DOCD: CPT | Mod: CPTII,,, | Performed by: NURSE PRACTITIONER

## 2025-07-29 PROCEDURE — 99214 OFFICE O/P EST MOD 30 MIN: CPT | Mod: S$PBB,,, | Performed by: NURSE PRACTITIONER

## 2025-07-29 PROCEDURE — 3074F SYST BP LT 130 MM HG: CPT | Mod: CPTII,,, | Performed by: NURSE PRACTITIONER

## 2025-07-29 PROCEDURE — 99214 OFFICE O/P EST MOD 30 MIN: CPT | Mod: PBBFAC,PN | Performed by: NURSE PRACTITIONER

## 2025-07-29 PROCEDURE — 1159F MED LIST DOCD IN RCRD: CPT | Mod: CPTII,,, | Performed by: NURSE PRACTITIONER

## 2025-07-29 PROCEDURE — 4010F ACE/ARB THERAPY RXD/TAKEN: CPT | Mod: CPTII,,, | Performed by: NURSE PRACTITIONER

## 2025-07-29 PROCEDURE — 3051F HG A1C>EQUAL 7.0%<8.0%: CPT | Mod: CPTII,,, | Performed by: NURSE PRACTITIONER

## 2025-07-29 RX ORDER — DULOXETIN HYDROCHLORIDE 30 MG/1
30 CAPSULE, DELAYED RELEASE ORAL 2 TIMES DAILY
Qty: 60 CAPSULE | Refills: 3 | Status: SHIPPED | OUTPATIENT
Start: 2025-07-29
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
